# Patient Record
Sex: FEMALE | Race: WHITE | Employment: UNEMPLOYED | URBAN - METROPOLITAN AREA
[De-identification: names, ages, dates, MRNs, and addresses within clinical notes are randomized per-mention and may not be internally consistent; named-entity substitution may affect disease eponyms.]

---

## 2017-02-23 ENCOUNTER — HOSPITAL ENCOUNTER (EMERGENCY)
Age: 27
Discharge: HOME | End: 2017-02-24
Attending: EMERGENCY MEDICINE
Payer: COMMERCIAL

## 2017-02-23 DIAGNOSIS — R10.9 ABDOMINAL CRAMPS: ICD-10-CM

## 2017-02-23 DIAGNOSIS — R19.7 DIARRHEA, UNSPECIFIED TYPE: Primary | ICD-10-CM

## 2017-02-23 LAB — HCG(URINE) PREGNANCY TEST: NEGATIVE

## 2017-02-23 PROCEDURE — 99284 EMERGENCY DEPT VISIT MOD MDM: CPT

## 2017-02-23 PROCEDURE — 81001 URINALYSIS AUTO W/SCOPE: CPT

## 2017-02-23 PROCEDURE — 87804 INFLUENZA ASSAY W/OPTIC: CPT

## 2017-02-23 PROCEDURE — 84703 CHORIONIC GONADOTROPIN ASSAY: CPT

## 2017-02-23 ASSESSMENT — PAIN SCALES - GENERAL: PAINLEVEL_OUTOF10: 7

## 2017-02-23 ASSESSMENT — PAIN DESCRIPTION - LOCATION: LOCATION: ABDOMEN

## 2017-02-23 ASSESSMENT — PAIN DESCRIPTION - ORIENTATION: ORIENTATION: LOWER

## 2017-02-23 ASSESSMENT — PAIN DESCRIPTION - PAIN TYPE: TYPE: ACUTE PAIN

## 2017-02-24 VITALS
WEIGHT: 210 LBS | RESPIRATION RATE: 16 BRPM | TEMPERATURE: 98.6 F | DIASTOLIC BLOOD PRESSURE: 69 MMHG | OXYGEN SATURATION: 98 % | HEART RATE: 103 BPM | HEIGHT: 67 IN | SYSTOLIC BLOOD PRESSURE: 123 MMHG | BODY MASS INDEX: 32.96 KG/M2

## 2017-02-24 LAB
-: ABNORMAL
AMORPHOUS: ABNORMAL
BACTERIA: ABNORMAL
BILIRUBIN URINE: ABNORMAL
CASTS UA: ABNORMAL /LPF
COLOR: ABNORMAL
COMMENT UA: ABNORMAL
CRYSTALS, UA: ABNORMAL /HPF
DIRECT EXAM: NORMAL
EPITHELIAL CELLS UA: ABNORMAL /HPF
GLUCOSE URINE: NEGATIVE
KETONES, URINE: NEGATIVE
LEUKOCYTE ESTERASE, URINE: NEGATIVE
Lab: NORMAL
MUCUS: ABNORMAL
NITRITE, URINE: NEGATIVE
OTHER OBSERVATIONS UA: ABNORMAL
PH UA: 5.5 (ref 5–8)
PROTEIN UA: ABNORMAL
RBC UA: ABNORMAL /HPF
RENAL EPITHELIAL, UA: ABNORMAL /HPF
SPECIFIC GRAVITY UA: 1.03 (ref 1–1.03)
SPECIMEN DESCRIPTION: NORMAL
SPECIMEN DESCRIPTION: NORMAL
STATUS: NORMAL
TRICHOMONAS: ABNORMAL
TURBIDITY: ABNORMAL
URINE HGB: NEGATIVE
UROBILINOGEN, URINE: NORMAL
WBC UA: ABNORMAL /HPF
YEAST: ABNORMAL

## 2017-02-24 RX ORDER — LOPERAMIDE HYDROCHLORIDE 2 MG/1
2 CAPSULE ORAL 4 TIMES DAILY PRN
Qty: 10 CAPSULE | Refills: 0 | Status: SHIPPED | OUTPATIENT
Start: 2017-02-24 | End: 2017-07-18

## 2017-02-24 ASSESSMENT — ENCOUNTER SYMPTOMS
RHINORRHEA: 0
NAUSEA: 1
COUGH: 0
SHORTNESS OF BREATH: 0
PHOTOPHOBIA: 0
SORE THROAT: 0
EYE PAIN: 0
DIARRHEA: 1
ABDOMINAL PAIN: 0
VOMITING: 1
EYE DISCHARGE: 0
TROUBLE SWALLOWING: 0

## 2017-07-18 ENCOUNTER — OFFICE VISIT (OUTPATIENT)
Dept: OBGYN CLINIC | Age: 27
End: 2017-07-18
Payer: COMMERCIAL

## 2017-07-18 ENCOUNTER — HOSPITAL ENCOUNTER (OUTPATIENT)
Age: 27
Setting detail: SPECIMEN
Discharge: HOME | End: 2017-07-18
Payer: COMMERCIAL

## 2017-07-18 VITALS
DIASTOLIC BLOOD PRESSURE: 84 MMHG | SYSTOLIC BLOOD PRESSURE: 128 MMHG | HEIGHT: 66 IN | WEIGHT: 293 LBS | HEART RATE: 78 BPM | BODY MASS INDEX: 47.09 KG/M2

## 2017-07-18 DIAGNOSIS — Z30.011 FAMILY PLANNING, BCP (BIRTH CONTROL PILLS) INITIAL PRESCRIPTION: ICD-10-CM

## 2017-07-18 DIAGNOSIS — N94.6 DYSMENORRHEA: ICD-10-CM

## 2017-07-18 DIAGNOSIS — R35.0 URINARY FREQUENCY: ICD-10-CM

## 2017-07-18 DIAGNOSIS — R10.2 PELVIC PAIN IN FEMALE: ICD-10-CM

## 2017-07-18 DIAGNOSIS — Z01.419 WELL FEMALE EXAM WITH ROUTINE GYNECOLOGICAL EXAM: Primary | ICD-10-CM

## 2017-07-18 DIAGNOSIS — Z01.419 WELL FEMALE EXAM WITH ROUTINE GYNECOLOGICAL EXAM: ICD-10-CM

## 2017-07-18 LAB
BILIRUBIN URINE: NEGATIVE
COLOR: YELLOW
COMMENT UA: NORMAL
DIRECT EXAM: NORMAL
GLUCOSE URINE: NEGATIVE
KETONES, URINE: NEGATIVE
LEUKOCYTE ESTERASE, URINE: NEGATIVE
Lab: NORMAL
NITRITE, URINE: NEGATIVE
PH UA: 7 (ref 5–8)
PROTEIN UA: NEGATIVE
SPECIFIC GRAVITY UA: 1.02 (ref 1–1.03)
SPECIMEN DESCRIPTION: NORMAL
STATUS: NORMAL
TURBIDITY: CLEAR
URINE HGB: NEGATIVE
UROBILINOGEN, URINE: NORMAL

## 2017-07-18 PROCEDURE — 99214 OFFICE O/P EST MOD 30 MIN: CPT | Performed by: NURSE PRACTITIONER

## 2017-07-18 PROCEDURE — G0145 SCR C/V CYTO,THINLAYER,RESCR: HCPCS

## 2017-07-18 PROCEDURE — 87491 CHLMYD TRACH DNA AMP PROBE: CPT

## 2017-07-18 PROCEDURE — 87480 CANDIDA DNA DIR PROBE: CPT

## 2017-07-18 PROCEDURE — 87591 N.GONORRHOEAE DNA AMP PROB: CPT

## 2017-07-18 PROCEDURE — 87660 TRICHOMONAS VAGIN DIR PROBE: CPT

## 2017-07-18 PROCEDURE — 87510 GARDNER VAG DNA DIR PROBE: CPT

## 2017-07-18 PROCEDURE — 81003 URINALYSIS AUTO W/O SCOPE: CPT

## 2017-07-18 RX ORDER — NORETHINDRONE ACETATE AND ETHINYL ESTRADIOL 1MG-20(21)
1 KIT ORAL DAILY
Qty: 1 PACKET | Refills: 3 | Status: SHIPPED | OUTPATIENT
Start: 2017-07-18 | End: 2017-07-31 | Stop reason: ALTCHOICE

## 2017-07-18 ASSESSMENT — PATIENT HEALTH QUESTIONNAIRE - PHQ9
SUM OF ALL RESPONSES TO PHQ9 QUESTIONS 1 & 2: 0
1. LITTLE INTEREST OR PLEASURE IN DOING THINGS: 0
2. FEELING DOWN, DEPRESSED OR HOPELESS: 0
SUM OF ALL RESPONSES TO PHQ QUESTIONS 1-9: 0

## 2017-07-19 LAB
C TRACH DNA GENITAL QL NAA+PROBE: NEGATIVE
N. GONORRHOEAE DNA: NEGATIVE

## 2017-07-21 LAB — CYTOLOGY REPORT: NORMAL

## 2017-07-31 ENCOUNTER — TELEPHONE (OUTPATIENT)
Dept: OBGYN CLINIC | Age: 27
End: 2017-07-31

## 2017-07-31 RX ORDER — NORGESTIMATE AND ETHINYL ESTRADIOL 7DAYSX3 28
1 KIT ORAL DAILY
Qty: 28 TABLET | Refills: 3 | Status: SHIPPED | OUTPATIENT
Start: 2017-07-31 | End: 2017-08-08

## 2017-08-02 ENCOUNTER — TELEPHONE (OUTPATIENT)
Dept: OBGYN CLINIC | Age: 27
End: 2017-08-02

## 2017-08-08 ENCOUNTER — OFFICE VISIT (OUTPATIENT)
Dept: FAMILY MEDICINE CLINIC | Age: 27
End: 2017-08-08
Payer: COMMERCIAL

## 2017-08-08 VITALS
SYSTOLIC BLOOD PRESSURE: 128 MMHG | OXYGEN SATURATION: 98 % | BODY MASS INDEX: 47.09 KG/M2 | WEIGHT: 293 LBS | HEIGHT: 66 IN | HEART RATE: 104 BPM | DIASTOLIC BLOOD PRESSURE: 89 MMHG

## 2017-08-08 DIAGNOSIS — R63.5 WEIGHT GAIN: ICD-10-CM

## 2017-08-08 DIAGNOSIS — Z11.4 ENCOUNTER FOR SCREENING FOR HIV: ICD-10-CM

## 2017-08-08 DIAGNOSIS — E66.01 MORBID OBESITY DUE TO EXCESS CALORIES (HCC): Primary | ICD-10-CM

## 2017-08-08 DIAGNOSIS — R53.82 CHRONIC FATIGUE: ICD-10-CM

## 2017-08-08 PROCEDURE — 99204 OFFICE O/P NEW MOD 45 MIN: CPT | Performed by: FAMILY MEDICINE

## 2017-10-23 ENCOUNTER — OFFICE VISIT (OUTPATIENT)
Dept: OBGYN CLINIC | Age: 27
End: 2017-10-23
Payer: COMMERCIAL

## 2017-10-23 VITALS
BODY MASS INDEX: 45.99 KG/M2 | DIASTOLIC BLOOD PRESSURE: 72 MMHG | WEIGHT: 293 LBS | HEIGHT: 67 IN | RESPIRATION RATE: 18 BRPM | HEART RATE: 80 BPM | SYSTOLIC BLOOD PRESSURE: 116 MMHG

## 2017-10-23 DIAGNOSIS — N94.6 DYSMENORRHEA: Primary | ICD-10-CM

## 2017-10-23 DIAGNOSIS — N92.0 MENORRHAGIA WITH REGULAR CYCLE: ICD-10-CM

## 2017-10-23 PROCEDURE — 99213 OFFICE O/P EST LOW 20 MIN: CPT | Performed by: ADVANCED PRACTICE MIDWIFE

## 2017-10-23 PROCEDURE — 4004F PT TOBACCO SCREEN RCVD TLK: CPT | Performed by: ADVANCED PRACTICE MIDWIFE

## 2017-10-23 PROCEDURE — G8417 CALC BMI ABV UP PARAM F/U: HCPCS | Performed by: ADVANCED PRACTICE MIDWIFE

## 2017-10-23 PROCEDURE — G8484 FLU IMMUNIZE NO ADMIN: HCPCS | Performed by: ADVANCED PRACTICE MIDWIFE

## 2017-10-23 PROCEDURE — G8427 DOCREV CUR MEDS BY ELIG CLIN: HCPCS | Performed by: ADVANCED PRACTICE MIDWIFE

## 2017-10-23 RX ORDER — IBUPROFEN 800 MG/1
800 TABLET ORAL EVERY 8 HOURS PRN
Qty: 30 TABLET | Refills: 1 | Status: SHIPPED | OUTPATIENT
Start: 2017-10-23 | End: 2017-10-31 | Stop reason: SDUPTHER

## 2017-10-23 RX ORDER — NORGESTIMATE AND ETHINYL ESTRADIOL 7DAYSX3 28
1 KIT ORAL DAILY
Qty: 28 TABLET | Refills: 9 | Status: SHIPPED | OUTPATIENT
Start: 2017-10-23 | End: 2017-10-23

## 2017-10-23 ASSESSMENT — PATIENT HEALTH QUESTIONNAIRE - PHQ9
2. FEELING DOWN, DEPRESSED OR HOPELESS: 0
1. LITTLE INTEREST OR PLEASURE IN DOING THINGS: 0
SUM OF ALL RESPONSES TO PHQ9 QUESTIONS 1 & 2: 0
SUM OF ALL RESPONSES TO PHQ QUESTIONS 1-9: 0

## 2017-10-23 NOTE — PROGRESS NOTES
Radha Espinoza  10/23/2017    YOB: 1990          The patient was seen today. She is here regarding BCP causing her H/A and upset stomach, patient stopped taking it. She states she is sexually active and will use condoms. She wanted to start BCP because of her periods being painful and heavy. Her bowels are regular and she is voiding without difficulty. HPI:  Jose J Velazco is a 33 y.o. female  Dysmenorrhea and menorrhagia, does not want to take BCP       Obstetric History       T0      L0     SAB0   TAB0   Ectopic0   Molar0   Multiple0   Live Births0           Past Medical History:   Diagnosis Date    H/O trichomonal vaginitis     Obesity        Past Surgical History:   Procedure Laterality Date    MOUTH SURGERY         Family History   Problem Relation Age of Onset    Mental Illness Mother     COPD Mother     No Known Problems Father        Social History     Social History    Marital status: Single     Spouse name: N/A    Number of children: N/A    Years of education: N/A     Occupational History    Not on file. Social History Main Topics    Smoking status: Current Every Day Smoker     Packs/day: 0.50     Types: Cigarettes    Smokeless tobacco: Never Used    Alcohol use No    Drug use: No    Sexual activity: Yes     Partners: Male     Other Topics Concern    Not on file     Social History Narrative    No narrative on file         MEDICATIONS:  Current Outpatient Prescriptions   Medication Sig Dispense Refill    ibuprofen (ADVIL;MOTRIN) 800 MG tablet Take 1 tablet by mouth every 8 hours as needed for Pain 30 tablet 1     No current facility-administered medications for this visit. ALLERGIES:  Allergies as of 10/23/2017    (No Known Allergies)         REVIEW OF SYSTEMS:    yes   A minimum of an eleven point review of systems was completed. Review Of Systems (11 point):  Constitutional: No fever, chills or malaise;  No weight change or fatigue  Head and Eyes: No vision, Headache, Dizziness or trauma in last 12 months  ENT ROS: No hearing, Tinnitis, sinus or taste problems  Hematological and Lymphatic ROS:No Lymphoma, Von Willebrand's, Hemophillia or Bleeding History  Psych ROS: No Depression, Homicidal thoughts,suicidal thoughts, or anxiety  Breast ROS: No prior breast abnormalities or lumps  Respiratory ROS: No SOB, Pneumoniae,Cough, or Pulmonary Embolism History  Cardiovascular ROS: No Chest Pain with Exertion, Palpitations, Syncope, Edema, Arrhythmia  Gastrointestinal ROS: No Indigestion, Heartburn, Nausea, vomiting, Diarrhea, Constipation,or Bowel Changes; No Bloody Stools or melena  Genito-Urinary ROS: No Dysuria, Hematuria or Nocturia. No Urinary Incontinence or Vaginal Discharge  Musculoskeletal ROS: No Arthralgia, Arthritis,Gout,Osteoporosis or Rheumatism  Neurological ROS: No CVA, Migraines, Epilepsy, Seizure Hx, or Limb Weakness  Dermatological ROS: No Rash, Itching, Hives, Mole Changes or Cancer          Blood pressure 116/72, pulse 80, resp. rate 18, height 5' 7\" (1.702 m), weight (!) 316 lb (143.3 kg), last menstrual period 10/21/2017, not currently breastfeeding. Abdomen: Soft non-tender; good bowel sounds. No guarding, rebound or rigidity. No CVA tenderness bilaterally. Extremities: No calf tenderness, DTR 2/4, and No edema bilaterally    Pelvic: declined    Diagnostics:  No results found. Lab Results:  Results for orders placed or performed during the hospital encounter of 07/18/17   VAGINITIS DNA PROBE   Result Value Ref Range    Specimen Description . VAGINAL SWAB     Special Requests NOT REPORTED     Direct Exam NEGATIVE for Gardnerella vaginalis     Direct Exam NEGATIVE for Trichomonas vaginalis     Direct Exam NEGATIVE for Candida sp.      Direct Exam       Method of testing is a DNA probe intended for detection and identification of    Direct Exam        Candida species, Gardnerella vaginalis, and Trichomonas found. Patient Active Problem List    Diagnosis Date Noted    H/O trichomonal vaginitis            PLAN:  Rx for Motrin 800 mg sent to pharmacy to take one tablet every 8 hours for first 48 hours of cycle  Repeat Annual every 1 year  Cervical Cytology Evaluation begins at 24years old. If Negative Cytology, Follow-up screening per current guidelines. Return to the office prn. Counseled on preventative health maintenance follow-up. No orders of the defined types were placed in this encounter. Patient was seen with total face to face time of 15 minutes. More than 50% of this visit was counseling and education regarding There were no encounter diagnoses. and 3 Month Follow-Up   as well as  counseling on preventative health maintenance follow-up.

## 2017-11-01 RX ORDER — IBUPROFEN 800 MG/1
800 TABLET ORAL EVERY 8 HOURS PRN
Qty: 30 TABLET | Refills: 1 | Status: SHIPPED | OUTPATIENT
Start: 2017-11-01 | End: 2017-11-16 | Stop reason: SDUPTHER

## 2017-11-12 ENCOUNTER — HOSPITAL ENCOUNTER (EMERGENCY)
Age: 27
Discharge: HOME | End: 2017-11-12
Attending: EMERGENCY MEDICINE
Payer: COMMERCIAL

## 2017-11-12 VITALS
TEMPERATURE: 97.6 F | OXYGEN SATURATION: 99 % | SYSTOLIC BLOOD PRESSURE: 125 MMHG | HEART RATE: 102 BPM | RESPIRATION RATE: 18 BRPM | HEIGHT: 67 IN | WEIGHT: 250 LBS | DIASTOLIC BLOOD PRESSURE: 74 MMHG | BODY MASS INDEX: 39.24 KG/M2

## 2017-11-12 DIAGNOSIS — K08.89 PAIN, DENTAL: Primary | ICD-10-CM

## 2017-11-12 PROCEDURE — 99282 EMERGENCY DEPT VISIT SF MDM: CPT

## 2017-11-12 RX ORDER — PENICILLIN V POTASSIUM 500 MG/1
500 TABLET ORAL 4 TIMES DAILY
Qty: 40 TABLET | Refills: 0 | Status: SHIPPED | OUTPATIENT
Start: 2017-11-12 | End: 2017-11-22

## 2017-11-12 RX ORDER — IBUPROFEN 800 MG/1
800 TABLET ORAL EVERY 6 HOURS PRN
Qty: 20 TABLET | Refills: 0 | Status: SHIPPED | OUTPATIENT
Start: 2017-11-12 | End: 2019-04-01

## 2017-11-12 RX ORDER — ACETAMINOPHEN AND CODEINE PHOSPHATE 300; 30 MG/1; MG/1
1 TABLET ORAL EVERY 4 HOURS PRN
Qty: 10 TABLET | Refills: 0 | Status: SHIPPED | OUTPATIENT
Start: 2017-11-12 | End: 2018-01-25 | Stop reason: ALTCHOICE

## 2017-11-12 ASSESSMENT — PAIN SCALES - GENERAL: PAINLEVEL_OUTOF10: 7

## 2017-11-12 ASSESSMENT — PAIN DESCRIPTION - LOCATION: LOCATION: TEETH

## 2017-11-12 ASSESSMENT — PAIN DESCRIPTION - PAIN TYPE: TYPE: ACUTE PAIN

## 2017-11-12 ASSESSMENT — PAIN DESCRIPTION - DESCRIPTORS: DESCRIPTORS: CONSTANT

## 2017-11-12 NOTE — ED PROVIDER NOTES
(Oral)   Resp 18   Ht 5' 7\" (1.702 m)   Wt 250 lb (113.4 kg)   LMP 09/21/2017   SpO2 99%   BMI 39.16 kg/m²   CONSTITUTIONAL: Vital signs reviewed, Alert and oriented X 3. HEAD: Atraumatic, Normocephalic. EYES: Eyes are normal to inspection, Pupils equal, round and reactive to light. NECK: Normal ROM, No jugular venous distention, No meningeal signs, Cervical spine nontender. MOUTH:  + dental pain at 1, 17, with no signs of abscess formation or Chris sign noted. No swelling involving the airway at all. No trismus. Lips are normal.  No tongue elevation. Speaking in full sentences. RESPIRATORY CHEST: No respiratory distress. ABDOMEN: Abdomen is nontender, No distension. UPPER EXTREMITY: Inspection normal, No cyanosis. NEURO: GCS is 15, Speech normal, Memory normal.   SKIN: Skin is warm, Skin is dry. PSYCHIATRIC: Oriented X 3, Normal affect. EMERGENCY DEPARTMENT COURSE:   Pain meds and antibiotic prescriptions. Pt provided with dental clinic list and instructed to call as soon as possible for an appointment. Instructed to return for worsening or any new symptoms including throat swelling, difficulty swallowing or breathing. Pt agrees. FINAL IMPRESSION:     1. Pain, dental          DISPOSITION:  DISPOSITION Decision to Discharge      PATIENT REFERRED TO:  Moy Mcduffie MD  [address] [facility] [address] [phone]    Call in 1 day      [address] [facility]  [place] [address]  [phone]    If symptoms worsen    dentist  see dental list          DISCHARGE MEDICATIONS:  New Prescriptions    ACETAMINOPHEN-CODEINE (TYLENOL/CODEINE #3) 300-30 MG PER TABLET    Take 1 tablet by mouth every 4 hours as needed for Pain .     IBUPROFEN (ADVIL;MOTRIN) 800 MG TABLET    Take 1 tablet by mouth every 6 hours as needed for Pain    PENICILLIN V POTASSIUM (VEETID) 500 MG TABLET    Take 1 tablet by mouth 4 times daily for 10 days       (Please note that

## 2017-11-16 RX ORDER — IBUPROFEN 800 MG/1
800 TABLET ORAL EVERY 8 HOURS PRN
Qty: 30 TABLET | Refills: 1 | Status: SHIPPED | OUTPATIENT
Start: 2017-11-16 | End: 2018-01-25 | Stop reason: SDUPTHER

## 2018-01-25 ENCOUNTER — OFFICE VISIT (OUTPATIENT)
Dept: INTERNAL MEDICINE CLINIC | Age: 28
End: 2018-01-25
Payer: COMMERCIAL

## 2018-01-25 VITALS
WEIGHT: 293 LBS | HEIGHT: 67 IN | BODY MASS INDEX: 45.99 KG/M2 | DIASTOLIC BLOOD PRESSURE: 68 MMHG | HEART RATE: 83 BPM | SYSTOLIC BLOOD PRESSURE: 122 MMHG | OXYGEN SATURATION: 99 %

## 2018-01-25 DIAGNOSIS — F17.200 SMOKER: ICD-10-CM

## 2018-01-25 DIAGNOSIS — E66.01 MORBIDLY OBESE (HCC): ICD-10-CM

## 2018-01-25 DIAGNOSIS — G47.33 OSA (OBSTRUCTIVE SLEEP APNEA): ICD-10-CM

## 2018-01-25 DIAGNOSIS — I10 ESSENTIAL HYPERTENSION: Primary | ICD-10-CM

## 2018-01-25 PROCEDURE — 99215 OFFICE O/P EST HI 40 MIN: CPT | Performed by: INTERNAL MEDICINE

## 2018-01-25 PROCEDURE — G8417 CALC BMI ABV UP PARAM F/U: HCPCS | Performed by: INTERNAL MEDICINE

## 2018-01-25 PROCEDURE — 4004F PT TOBACCO SCREEN RCVD TLK: CPT | Performed by: INTERNAL MEDICINE

## 2018-01-25 PROCEDURE — G8484 FLU IMMUNIZE NO ADMIN: HCPCS | Performed by: INTERNAL MEDICINE

## 2018-01-25 PROCEDURE — G8427 DOCREV CUR MEDS BY ELIG CLIN: HCPCS | Performed by: INTERNAL MEDICINE

## 2018-01-25 RX ORDER — VARENICLINE TARTRATE 25 MG
KIT ORAL
Qty: 1 EACH | Refills: 0 | Status: SHIPPED | OUTPATIENT
Start: 2018-01-25 | End: 2018-02-22 | Stop reason: SDUPTHER

## 2018-01-25 RX ORDER — GUAIFENESIN AND DEXTROMETHORPHAN HYDROBROMIDE 600; 30 MG/1; MG/1
TABLET, EXTENDED RELEASE ORAL
Refills: 0 | COMMUNITY
Start: 2017-11-09 | End: 2019-04-01

## 2018-01-25 RX ORDER — FLUCONAZOLE 150 MG/1
TABLET ORAL
COMMUNITY
Start: 2017-11-03 | End: 2018-01-25 | Stop reason: ALTCHOICE

## 2018-01-25 RX ORDER — MEDICAL SUPPLY, MISCELLANEOUS
1 EACH MISCELLANEOUS DAILY
Qty: 1 EACH | Refills: 0 | Status: SHIPPED | OUTPATIENT
Start: 2018-01-25 | End: 2019-04-01

## 2018-01-25 ASSESSMENT — ENCOUNTER SYMPTOMS
SHORTNESS OF BREATH: 0
EYE REDNESS: 0
BACK PAIN: 0
EYE ITCHING: 0
ABDOMINAL PAIN: 0
CHEST TIGHTNESS: 0
DIARRHEA: 0
EYE DISCHARGE: 0
COLOR CHANGE: 0
APNEA: 1
ABDOMINAL DISTENTION: 0
COUGH: 0
CONSTIPATION: 0
BLOOD IN STOOL: 0
EYE PAIN: 0
CHOKING: 0

## 2018-01-25 ASSESSMENT — PATIENT HEALTH QUESTIONNAIRE - PHQ9
1. LITTLE INTEREST OR PLEASURE IN DOING THINGS: 1
SUM OF ALL RESPONSES TO PHQ QUESTIONS 1-9: 2
SUM OF ALL RESPONSES TO PHQ9 QUESTIONS 1 & 2: 2
2. FEELING DOWN, DEPRESSED OR HOPELESS: 1

## 2018-01-25 NOTE — PATIENT INSTRUCTIONS
slimmer you. You probably will lose weight very quickly in the first few months after surgery. As time goes on, your weight loss will slow down. How much weight you lose depends on what type of surgery you had and how well your new eating and activity plans are working for you. · A new way of eating. Success in reaching and keeping a healthy weight depends on making lifelong changes in how you eat. After surgery, you raise your chances of success if you:  ¨ Eat just a few ounces of food at a time. ¨ Eat very slowly and chew your food to mush. ¨ Don't drink for 30 minutes before you eat, during your meal, and for 30 minutes after you eat. ¨ Are careful about drinking alcohol. ¨ Avoid foods that are high in fat or sugar. ¨ Take vitamin and mineral supplements. · A healthier you. Weight-loss surgery can have some real health benefits. Problems like diabetes, high blood pressure, and sleep apnea may go away-or at least become easier to manage. · A more active you. After surgery, being active on most days of the week will help you reach your weight goal and avoid gaining back the weight you lose. · A lot of extra skin. When you lose weight quickly, you may have a lot of extra skin. That's normal. You can have surgery to remove the extra skin if it bothers you. There are going to be some ups and downs while you get used to these changes. So another way to adjust is to identify who can help support you. Getting support from friends and family can help. And joining a support group for people who have had the surgery can be a big help too, because they know what you're going through. As you know, it's a big decision to have weight-loss surgery. But when you have a plan, you can focus on losing weight and living a healthier life. So what steps can you take to prepare for weight-loss surgery? Will you set some goals? Will you learn about how surgery can affect your life? How about asking family or friends for help?

## 2018-01-30 ENCOUNTER — HOSPITAL ENCOUNTER (EMERGENCY)
Age: 28
Discharge: HOME | End: 2018-01-30
Attending: EMERGENCY MEDICINE
Payer: COMMERCIAL

## 2018-01-30 VITALS
BODY MASS INDEX: 44.73 KG/M2 | OXYGEN SATURATION: 96 % | HEIGHT: 67 IN | TEMPERATURE: 98.1 F | RESPIRATION RATE: 16 BRPM | DIASTOLIC BLOOD PRESSURE: 96 MMHG | SYSTOLIC BLOOD PRESSURE: 164 MMHG | HEART RATE: 96 BPM | WEIGHT: 285 LBS

## 2018-01-30 DIAGNOSIS — L30.9 DERMATITIS: Primary | ICD-10-CM

## 2018-01-30 PROCEDURE — 99282 EMERGENCY DEPT VISIT SF MDM: CPT

## 2018-01-30 RX ORDER — DIPHENHYDRAMINE HCL 25 MG
25 CAPSULE ORAL EVERY 6 HOURS PRN
Qty: 20 CAPSULE | Refills: 0 | Status: SHIPPED | OUTPATIENT
Start: 2018-01-30 | End: 2018-02-04

## 2018-01-30 RX ORDER — FAMOTIDINE 20 MG/1
20 TABLET, FILM COATED ORAL 2 TIMES DAILY
Qty: 20 TABLET | Refills: 0 | Status: SHIPPED | OUTPATIENT
Start: 2018-01-30 | End: 2019-04-01

## 2018-01-30 ASSESSMENT — ENCOUNTER SYMPTOMS
THROAT SWELLING: 0
VOMITING: 0
WHEEZING: 0
SORE THROAT: 0

## 2018-02-22 DIAGNOSIS — F17.200 SMOKER: ICD-10-CM

## 2018-02-22 RX ORDER — VARENICLINE TARTRATE
KIT
Qty: 1 EACH | Refills: 1 | Status: SHIPPED | OUTPATIENT
Start: 2018-02-22 | End: 2019-04-01

## 2019-03-10 ENCOUNTER — HOSPITAL ENCOUNTER (EMERGENCY)
Age: 29
Discharge: HOME | End: 2019-03-10
Attending: EMERGENCY MEDICINE
Payer: COMMERCIAL

## 2019-03-10 VITALS
OXYGEN SATURATION: 97 % | TEMPERATURE: 98.1 F | SYSTOLIC BLOOD PRESSURE: 149 MMHG | DIASTOLIC BLOOD PRESSURE: 92 MMHG | RESPIRATION RATE: 17 BRPM | HEIGHT: 66 IN | HEART RATE: 93 BPM | WEIGHT: 275 LBS | BODY MASS INDEX: 44.2 KG/M2

## 2019-03-10 DIAGNOSIS — J06.9 UPPER RESPIRATORY TRACT INFECTION, UNSPECIFIED TYPE: Primary | ICD-10-CM

## 2019-03-10 LAB
DIRECT EXAM: NORMAL
DIRECT EXAM: NORMAL
Lab: NORMAL
Lab: NORMAL
SPECIMEN DESCRIPTION: NORMAL
SPECIMEN DESCRIPTION: NORMAL

## 2019-03-10 PROCEDURE — 99283 EMERGENCY DEPT VISIT LOW MDM: CPT

## 2019-03-10 PROCEDURE — 87880 STREP A ASSAY W/OPTIC: CPT

## 2019-03-10 PROCEDURE — 87804 INFLUENZA ASSAY W/OPTIC: CPT

## 2019-03-10 RX ORDER — FLUTICASONE PROPIONATE 50 MCG
1 SPRAY, SUSPENSION (ML) NASAL DAILY
Qty: 1 BOTTLE | Refills: 0 | Status: SHIPPED | OUTPATIENT
Start: 2019-03-10 | End: 2019-04-01

## 2019-03-10 ASSESSMENT — PAIN DESCRIPTION - LOCATION: LOCATION: HEAD

## 2019-03-10 ASSESSMENT — ENCOUNTER SYMPTOMS
COUGH: 1
RHINORRHEA: 1

## 2019-03-10 ASSESSMENT — PAIN DESCRIPTION - DESCRIPTORS: DESCRIPTORS: HEADACHE

## 2019-03-10 ASSESSMENT — PAIN DESCRIPTION - FREQUENCY: FREQUENCY: CONTINUOUS

## 2019-03-10 ASSESSMENT — PAIN SCALES - GENERAL: PAINLEVEL_OUTOF10: 4

## 2019-03-10 ASSESSMENT — PAIN DESCRIPTION - PAIN TYPE: TYPE: ACUTE PAIN

## 2019-03-13 ENCOUNTER — TELEPHONE (OUTPATIENT)
Dept: INTERNAL MEDICINE CLINIC | Age: 29
End: 2019-03-13

## 2019-04-01 ENCOUNTER — HOSPITAL ENCOUNTER (OUTPATIENT)
Age: 29
Setting detail: SPECIMEN
Discharge: HOME | End: 2019-04-01
Payer: COMMERCIAL

## 2019-04-01 ENCOUNTER — OFFICE VISIT (OUTPATIENT)
Dept: OBGYN CLINIC | Age: 29
End: 2019-04-01
Payer: COMMERCIAL

## 2019-04-01 VITALS
RESPIRATION RATE: 19 BRPM | BODY MASS INDEX: 45.99 KG/M2 | WEIGHT: 293 LBS | SYSTOLIC BLOOD PRESSURE: 145 MMHG | TEMPERATURE: 97.4 F | HEIGHT: 67 IN | DIASTOLIC BLOOD PRESSURE: 90 MMHG | HEART RATE: 92 BPM

## 2019-04-01 DIAGNOSIS — R03.0 ELEVATED BLOOD PRESSURE READING: ICD-10-CM

## 2019-04-01 DIAGNOSIS — N92.6 MISSED MENSES: ICD-10-CM

## 2019-04-01 DIAGNOSIS — N92.6 MISSED MENSES: Primary | ICD-10-CM

## 2019-04-01 DIAGNOSIS — Z32.02 NEGATIVE PREGNANCY TEST: ICD-10-CM

## 2019-04-01 DIAGNOSIS — N91.2 AMENORRHEA: ICD-10-CM

## 2019-04-01 LAB
CONTROL: NORMAL
HCG QUANTITATIVE: <1 IU/L
PREGNANCY TEST URINE, POC: NEGATIVE

## 2019-04-01 PROCEDURE — G8427 DOCREV CUR MEDS BY ELIG CLIN: HCPCS | Performed by: CLINICAL NURSE SPECIALIST

## 2019-04-01 PROCEDURE — 81025 URINE PREGNANCY TEST: CPT | Performed by: CLINICAL NURSE SPECIALIST

## 2019-04-01 PROCEDURE — 99203 OFFICE O/P NEW LOW 30 MIN: CPT | Performed by: CLINICAL NURSE SPECIALIST

## 2019-04-01 PROCEDURE — G8417 CALC BMI ABV UP PARAM F/U: HCPCS | Performed by: CLINICAL NURSE SPECIALIST

## 2019-04-01 PROCEDURE — 4004F PT TOBACCO SCREEN RCVD TLK: CPT | Performed by: CLINICAL NURSE SPECIALIST

## 2019-04-01 ASSESSMENT — ENCOUNTER SYMPTOMS
RESPIRATORY NEGATIVE: 1
EYES NEGATIVE: 1
GASTROINTESTINAL NEGATIVE: 1
ALLERGIC/IMMUNOLOGIC NEGATIVE: 1

## 2019-04-01 NOTE — PROGRESS NOTES
Subjective:      Patient ID:  Darryn Cohen is a 33 y.o. female who presents for   Chief Complaint   Patient presents with    Amenorrhea     LMP 1/15/2019       HPI     New patient is a 34 yo female who presents for missed menses for the past 2 months. Patient reports that she has never missed menstrual cycle in the past.  Once monthly menses lasting 7 days and heavy days 1-5 then becomes moderate. Patient denies any menstrual cramps. Patient reports that she started a new job but does not feel stressed over it. Review of Systems   Constitutional: Negative for chills and fever. HENT: Negative. Eyes: Negative. Respiratory: Negative. Cardiovascular: Negative. Gastrointestinal: Negative. Endocrine: Negative. Genitourinary: Positive for menstrual problem (patient has missed the last 2 months of her menstrual cycle and reports this is not normal for her). Musculoskeletal: Negative. Skin: Negative. Allergic/Immunologic: Negative. Neurological: Negative. Hematological: Negative. Psychiatric/Behavioral: Negative. BP (!) 145/90   Pulse 92   Temp 97.4 °F (36.3 °C) (Oral)   Resp 19   Ht 5' 7\" (1.702 m)   Wt (!) 330 lb 9.6 oz (150 kg)   LMP 01/15/2019 (Approximate)   BMI 51.78 kg/m²    Patient's last menstrual period was 01/15/2019 (approximate). Objective:   Physical Exam    Assessment:      Diagnosis Orders   1. Missed menses  POCT urine pregnancy    HCG, Quantitative, Pregnancy   2. Amenorrhea  POCT urine pregnancy    HCG, Quantitative, Pregnancy   3. Negative pregnancy test     4. Elevated blood pressure reading  [address] [facility], [facility], Family Medicine, [employer]           Plan:    Discussed with patient to monitor her BP and record and take recordings to PCP and patient verbalized understanding  Discussed with patient that if menses does not return next month she should schedule another appt. And will give provera to induce a menses.   Patient verbalized understanding. Patient was given FIONA Cabezas NP card     Return in about 1 month (around 5/1/2019) for if no menses. Patient was seen with total face to face time of 30 minutes. More than 50% of this visit was on counseling andeducation regarding the problems listed below and her options. She was also counseled on her preventative health maintenance recommendations and follow-up.     Electronically signed by: Tonya Lee CNP

## 2019-04-01 NOTE — PROGRESS NOTES
Patient was in the office today for a bhcg. Draw per physician order using sterile technique. Drawn from the right antecube.

## 2019-08-07 ENCOUNTER — HOSPITAL ENCOUNTER (OUTPATIENT)
Age: 29
Setting detail: SPECIMEN
Discharge: HOME | End: 2019-08-07
Payer: COMMERCIAL

## 2019-08-07 ENCOUNTER — OFFICE VISIT (OUTPATIENT)
Dept: OBGYN CLINIC | Age: 29
End: 2019-08-07
Payer: COMMERCIAL

## 2019-08-07 VITALS
WEIGHT: 293 LBS | DIASTOLIC BLOOD PRESSURE: 91 MMHG | BODY MASS INDEX: 45.99 KG/M2 | HEIGHT: 67 IN | SYSTOLIC BLOOD PRESSURE: 156 MMHG | HEART RATE: 109 BPM

## 2019-08-07 DIAGNOSIS — N91.2 AMENORRHEA: ICD-10-CM

## 2019-08-07 DIAGNOSIS — N91.2 AMENORRHEA: Primary | ICD-10-CM

## 2019-08-07 LAB
FOLLICLE STIMULATING HORMONE: 3.2 U/L (ref 1.7–21.5)
HCG QUANTITATIVE: <1 IU/L
LH: 10.4 U/L (ref 1–95.6)
TSH SERPL DL<=0.05 MIU/L-ACNC: 3.47 MIU/L (ref 0.3–5)

## 2019-08-07 PROCEDURE — 99213 OFFICE O/P EST LOW 20 MIN: CPT | Performed by: SPECIALIST

## 2019-08-07 PROCEDURE — 4004F PT TOBACCO SCREEN RCVD TLK: CPT | Performed by: SPECIALIST

## 2019-08-07 PROCEDURE — G8427 DOCREV CUR MEDS BY ELIG CLIN: HCPCS | Performed by: SPECIALIST

## 2019-08-07 PROCEDURE — G8417 CALC BMI ABV UP PARAM F/U: HCPCS | Performed by: SPECIALIST

## 2019-08-07 ASSESSMENT — ENCOUNTER SYMPTOMS
COUGH: 0
APNEA: 0
VOMITING: 0
DIARRHEA: 0
ABDOMINAL PAIN: 0
CONSTIPATION: 0
NAUSEA: 0
EYE PAIN: 0
ABDOMINAL DISTENTION: 0

## 2019-08-07 NOTE — PROGRESS NOTES
Appointment for ultrasound. Appointment in 1 week. Jesus Velazco am scribing for, and in the presence of Dr. Denise Kellogg.    Electronically signed by: Mera Jenkins 8/7/19 5:03 PM

## 2019-08-20 ENCOUNTER — OFFICE VISIT (OUTPATIENT)
Dept: OBGYN CLINIC | Age: 29
End: 2019-08-20
Payer: COMMERCIAL

## 2019-08-20 VITALS
HEIGHT: 67 IN | SYSTOLIC BLOOD PRESSURE: 153 MMHG | WEIGHT: 293 LBS | BODY MASS INDEX: 45.99 KG/M2 | DIASTOLIC BLOOD PRESSURE: 100 MMHG

## 2019-08-20 DIAGNOSIS — R03.0 ELEVATED BLOOD PRESSURE READING: ICD-10-CM

## 2019-08-20 DIAGNOSIS — N83.201 RIGHT OVARIAN CYST: ICD-10-CM

## 2019-08-20 DIAGNOSIS — E28.2 PCOS (POLYCYSTIC OVARIAN SYNDROME): Primary | ICD-10-CM

## 2019-08-20 PROCEDURE — G8427 DOCREV CUR MEDS BY ELIG CLIN: HCPCS | Performed by: SPECIALIST

## 2019-08-20 PROCEDURE — G8417 CALC BMI ABV UP PARAM F/U: HCPCS | Performed by: SPECIALIST

## 2019-08-20 PROCEDURE — 99213 OFFICE O/P EST LOW 20 MIN: CPT | Performed by: SPECIALIST

## 2019-08-20 PROCEDURE — 4004F PT TOBACCO SCREEN RCVD TLK: CPT | Performed by: SPECIALIST

## 2019-08-20 ASSESSMENT — ENCOUNTER SYMPTOMS
NAUSEA: 0
ABDOMINAL PAIN: 0
APNEA: 0
DIARRHEA: 0
COUGH: 0
EYE PAIN: 0
VOMITING: 0
ABDOMINAL DISTENTION: 0
CONSTIPATION: 0

## 2019-08-22 ENCOUNTER — OFFICE VISIT (OUTPATIENT)
Dept: FAMILY MEDICINE CLINIC | Age: 29
End: 2019-08-22
Payer: COMMERCIAL

## 2019-08-22 VITALS
BODY MASS INDEX: 45.99 KG/M2 | RESPIRATION RATE: 18 BRPM | TEMPERATURE: 97.2 F | SYSTOLIC BLOOD PRESSURE: 140 MMHG | HEIGHT: 67 IN | DIASTOLIC BLOOD PRESSURE: 89 MMHG | HEART RATE: 97 BPM | WEIGHT: 293 LBS | OXYGEN SATURATION: 99 %

## 2019-08-22 DIAGNOSIS — E28.2 POLYCYSTIC OVARIAN SYNDROME: ICD-10-CM

## 2019-08-22 DIAGNOSIS — E66.01 MORBID OBESITY WITH BMI OF 50.0-59.9, ADULT (HCC): ICD-10-CM

## 2019-08-22 DIAGNOSIS — I10 ESSENTIAL HYPERTENSION: Primary | ICD-10-CM

## 2019-08-22 PROCEDURE — 4004F PT TOBACCO SCREEN RCVD TLK: CPT | Performed by: FAMILY MEDICINE

## 2019-08-22 PROCEDURE — 99214 OFFICE O/P EST MOD 30 MIN: CPT | Performed by: FAMILY MEDICINE

## 2019-08-22 PROCEDURE — G8417 CALC BMI ABV UP PARAM F/U: HCPCS | Performed by: FAMILY MEDICINE

## 2019-08-22 PROCEDURE — G8427 DOCREV CUR MEDS BY ELIG CLIN: HCPCS | Performed by: FAMILY MEDICINE

## 2019-08-22 RX ORDER — LISINOPRIL 2.5 MG/1
2.5 TABLET ORAL DAILY
Qty: 30 TABLET | Refills: 1 | Status: SHIPPED | OUTPATIENT
Start: 2019-08-22 | End: 2019-09-19 | Stop reason: SDUPTHER

## 2019-08-22 ASSESSMENT — PATIENT HEALTH QUESTIONNAIRE - PHQ9
6. FEELING BAD ABOUT YOURSELF - OR THAT YOU ARE A FAILURE OR HAVE LET YOURSELF OR YOUR FAMILY DOWN: 0
SUM OF ALL RESPONSES TO PHQ9 QUESTIONS 1 & 2: 1
8. MOVING OR SPEAKING SO SLOWLY THAT OTHER PEOPLE COULD HAVE NOTICED. OR THE OPPOSITE, BEING SO FIGETY OR RESTLESS THAT YOU HAVE BEEN MOVING AROUND A LOT MORE THAN USUAL: 0
4. FEELING TIRED OR HAVING LITTLE ENERGY: 1
2. FEELING DOWN, DEPRESSED OR HOPELESS: 1
9. THOUGHTS THAT YOU WOULD BE BETTER OFF DEAD, OR OF HURTING YOURSELF: 0
SUM OF ALL RESPONSES TO PHQ QUESTIONS 1-9: 8
SUM OF ALL RESPONSES TO PHQ QUESTIONS 1-9: 8
10. IF YOU CHECKED OFF ANY PROBLEMS, HOW DIFFICULT HAVE THESE PROBLEMS MADE IT FOR YOU TO DO YOUR WORK, TAKE CARE OF THINGS AT HOME, OR GET ALONG WITH OTHER PEOPLE: 1
1. LITTLE INTEREST OR PLEASURE IN DOING THINGS: 0
3. TROUBLE FALLING OR STAYING ASLEEP: 3
7. TROUBLE CONCENTRATING ON THINGS, SUCH AS READING THE NEWSPAPER OR WATCHING TELEVISION: 0
5. POOR APPETITE OR OVEREATING: 3

## 2019-08-22 ASSESSMENT — ENCOUNTER SYMPTOMS
ABDOMINAL PAIN: 0
SHORTNESS OF BREATH: 0
NAUSEA: 0
SORE THROAT: 0

## 2019-08-28 ENCOUNTER — HOSPITAL ENCOUNTER (OUTPATIENT)
Age: 29
Discharge: HOME | End: 2019-08-28
Payer: COMMERCIAL

## 2019-08-28 DIAGNOSIS — I10 ESSENTIAL HYPERTENSION: ICD-10-CM

## 2019-08-28 LAB
ALBUMIN SERPL-MCNC: 4.7 G/DL (ref 3.5–5.2)
ALBUMIN/GLOBULIN RATIO: ABNORMAL (ref 1–2.5)
ALP BLD-CCNC: 60 U/L (ref 35–104)
ALT SERPL-CCNC: 36 U/L (ref 5–33)
ANION GAP SERPL CALCULATED.3IONS-SCNC: 12 MMOL/L (ref 9–17)
AST SERPL-CCNC: 21 U/L
BILIRUB SERPL-MCNC: 0.31 MG/DL (ref 0.3–1.2)
BUN BLDV-MCNC: 11 MG/DL (ref 6–20)
BUN/CREAT BLD: ABNORMAL (ref 9–20)
CALCIUM SERPL-MCNC: 9.6 MG/DL (ref 8.6–10.4)
CHLORIDE BLD-SCNC: 103 MMOL/L (ref 98–107)
CHOLESTEROL/HDL RATIO: 4.8
CHOLESTEROL: 139 MG/DL
CO2: 23 MMOL/L (ref 20–31)
CREAT SERPL-MCNC: 0.56 MG/DL (ref 0.5–0.9)
GFR AFRICAN AMERICAN: >60 ML/MIN
GFR NON-AFRICAN AMERICAN: >60 ML/MIN
GFR SERPL CREATININE-BSD FRML MDRD: ABNORMAL ML/MIN/{1.73_M2}
GFR SERPL CREATININE-BSD FRML MDRD: ABNORMAL ML/MIN/{1.73_M2}
GLUCOSE BLD-MCNC: 83 MG/DL (ref 70–99)
HDLC SERPL-MCNC: 29 MG/DL
LDL CHOLESTEROL: 89 MG/DL (ref 0–130)
POTASSIUM SERPL-SCNC: 4.3 MMOL/L (ref 3.7–5.3)
SODIUM BLD-SCNC: 138 MMOL/L (ref 135–144)
TOTAL PROTEIN: 8 G/DL (ref 6.4–8.3)
TRIGL SERPL-MCNC: 106 MG/DL
VLDLC SERPL CALC-MCNC: ABNORMAL MG/DL (ref 1–30)

## 2019-08-28 PROCEDURE — 80053 COMPREHEN METABOLIC PANEL: CPT

## 2019-08-28 PROCEDURE — 80061 LIPID PANEL: CPT

## 2019-08-28 PROCEDURE — 36415 COLL VENOUS BLD VENIPUNCTURE: CPT

## 2019-08-29 ENCOUNTER — OFFICE VISIT (OUTPATIENT)
Dept: FAMILY MEDICINE CLINIC | Age: 29
End: 2019-08-29
Payer: COMMERCIAL

## 2019-08-29 ENCOUNTER — TELEPHONE (OUTPATIENT)
Dept: OBGYN CLINIC | Age: 29
End: 2019-08-29

## 2019-08-29 VITALS
BODY MASS INDEX: 45.99 KG/M2 | OXYGEN SATURATION: 96 % | HEART RATE: 100 BPM | HEIGHT: 67 IN | DIASTOLIC BLOOD PRESSURE: 80 MMHG | WEIGHT: 293 LBS | SYSTOLIC BLOOD PRESSURE: 138 MMHG

## 2019-08-29 DIAGNOSIS — I10 ESSENTIAL HYPERTENSION: Primary | ICD-10-CM

## 2019-08-29 DIAGNOSIS — E66.01 MORBID OBESITY WITH BMI OF 50.0-59.9, ADULT (HCC): ICD-10-CM

## 2019-08-29 PROCEDURE — 99213 OFFICE O/P EST LOW 20 MIN: CPT | Performed by: FAMILY MEDICINE

## 2019-08-29 PROCEDURE — 4004F PT TOBACCO SCREEN RCVD TLK: CPT | Performed by: FAMILY MEDICINE

## 2019-08-29 PROCEDURE — G8427 DOCREV CUR MEDS BY ELIG CLIN: HCPCS | Performed by: FAMILY MEDICINE

## 2019-08-29 PROCEDURE — G8417 CALC BMI ABV UP PARAM F/U: HCPCS | Performed by: FAMILY MEDICINE

## 2019-08-29 ASSESSMENT — ENCOUNTER SYMPTOMS
NAUSEA: 0
ABDOMINAL PAIN: 0
SHORTNESS OF BREATH: 0
SORE THROAT: 0

## 2019-08-29 NOTE — PROGRESS NOTES
Subjective:      Patient ID: Armin Mckeon is a 33 y.o. female. Visit Information    Have you changed or started any medications since your last visit including any over-the-counter medicines, vitamins, or herbal medicines? no   Are you having any side effects from any of your medications? -  no  Have you stopped taking any of your medications? Is so, why? -  no    Have you seen any other physician or provider since your last visit? No  Have you had any other diagnostic tests since your last visit? Yes - Records Obtained  Have you been seen in the emergency room and/or had an admission to a hospital since we last saw you? No  Have you had your routine dental cleaning in the past 6 months? no    Have you activated your [employer] account? If not, what are your barriers? No: declined      Patient Care Team:  Chelsy Prieto MD as PCP - General (Family Medicine)  Chelsy Prieto MD as PCP - [facility]    Medical History Review  Past Medical, Family, and Social History reviewed and does contribute to the patient presenting condition    Health Maintenance   Topic Date Due    Potassium monitoring  1991    Creatinine monitoring  1991    Pneumococcal 0-64 years Vaccine (1 of 1 - PPSV23) 09/24/1997    Varicella Vaccine (1 of 2 - 13+ 2-dose series) 09/24/2004    HIV screen  09/24/2006    DTaP/Tdap/Td vaccine (1 - Tdap) 09/24/2010    Flu vaccine (1) 09/01/2019    Cervical cancer screen  07/18/2020    HPV vaccine  Aged Out     HPI  81-year-old female is seen in the office today for follow-up for her hypertension was started on Zestril her blood pressure is controlled and she is also lost weight. She was on metformin from her OB/GYN for polycystic ovarian syndrome she stopped taking it she states she is feeling sick with it so I told her she needs to call her OB and let him know if he wants to prescribe some other medication  Review of Systems   Constitutional: Negative for appetite change. HENT: Negative for ear pain, sneezing and sore throat. Eyes: Negative for visual disturbance. Respiratory: Negative for shortness of breath. Cardiovascular: Negative for chest pain. Gastrointestinal: Negative for abdominal pain and nausea. Genitourinary: Negative for frequency and pelvic pain. Musculoskeletal: Negative for arthralgias. Neurological: Negative for dizziness and headaches. Objective:   Physical Exam   Constitutional: She is oriented to person, place, and time. She appears well-developed and well-nourished. /80   Pulse 100   Ht 5' 7\" (1.702 m)   Wt (!) 326 lb 12.8 oz (148.2 kg)   LMP 08/12/2019 (Approximate)   SpO2 96%   BMI 51.18 kg/m²    HENT:   Head: Normocephalic. Mouth/Throat: Oropharynx is clear and moist.        Eyes: Conjunctivae are normal.   Cardiovascular: Normal rate and regular rhythm. Pulmonary/Chest: Breath sounds normal. She has no rales. Abdominal: Soft. Bowel sounds are normal. There is no tenderness. Musculoskeletal: She exhibits no edema. Lymphadenopathy:     She has no cervical adenopathy. Neurological: She is alert and oriented to person, place, and time. Nursing note and vitals reviewed. Assessment:        Diagnosis Orders   1. Essential hypertension   controlled   2. Morbid obesity with BMI of 50.0-59.9, adult ([facility] [address]   improving             Plan:       No orders of the defined types were placed in this encounter. No orders of the defined types were placed in this encounter. Return in about 3 months (around 11/29/2019) for HTN.     Continue current medications reviewed from the chart              Cathleen Rothman MA

## 2019-09-18 ENCOUNTER — OFFICE VISIT (OUTPATIENT)
Dept: OBGYN CLINIC | Age: 29
End: 2019-09-18
Payer: COMMERCIAL

## 2019-09-18 VITALS
SYSTOLIC BLOOD PRESSURE: 134 MMHG | HEART RATE: 90 BPM | WEIGHT: 293 LBS | HEIGHT: 67 IN | DIASTOLIC BLOOD PRESSURE: 85 MMHG | BODY MASS INDEX: 45.99 KG/M2

## 2019-09-18 DIAGNOSIS — E66.01 MORBID OBESITY WITH BODY MASS INDEX OF 45.0-49.9 IN ADULT (HCC): ICD-10-CM

## 2019-09-18 DIAGNOSIS — E28.2 PCOS (POLYCYSTIC OVARIAN SYNDROME): Primary | ICD-10-CM

## 2019-09-18 PROCEDURE — G8427 DOCREV CUR MEDS BY ELIG CLIN: HCPCS | Performed by: SPECIALIST

## 2019-09-18 PROCEDURE — 99212 OFFICE O/P EST SF 10 MIN: CPT | Performed by: SPECIALIST

## 2019-09-18 PROCEDURE — G8417 CALC BMI ABV UP PARAM F/U: HCPCS | Performed by: SPECIALIST

## 2019-09-18 PROCEDURE — 4004F PT TOBACCO SCREEN RCVD TLK: CPT | Performed by: SPECIALIST

## 2019-09-18 ASSESSMENT — ENCOUNTER SYMPTOMS
DIARRHEA: 0
ABDOMINAL PAIN: 0
EYE PAIN: 0
VOMITING: 0
APNEA: 0
ABDOMINAL DISTENTION: 0
COUGH: 0
CONSTIPATION: 0
NAUSEA: 0

## 2019-09-19 RX ORDER — ENALAPRIL MALEATE 2.5 MG/1
2.5 TABLET ORAL DAILY
Qty: 30 TABLET | Refills: 1 | Status: SHIPPED | OUTPATIENT
Start: 2019-09-19 | End: 2019-10-04 | Stop reason: SDUPTHER

## 2019-09-19 RX ORDER — LISINOPRIL 2.5 MG/1
2.5 TABLET ORAL DAILY
Qty: 30 TABLET | Refills: 1 | Status: CANCELLED | OUTPATIENT
Start: 2019-09-19

## 2019-09-19 NOTE — TELEPHONE ENCOUNTER
Please Approve or Refuse.   Send to Pharmacy per Pt's Request:      Next Visit Date:  12/3/2019   Last Visit Date: 8/29/2019    No results found for: LABA1C          ( goal A1C is < 7)   BP Readings from Last 3 Encounters:   09/18/19 134/85   08/29/19 138/80   08/22/19 (!) 140/89          (goal 120/80)  BUN   Date Value Ref Range Status   08/28/2019 11 6 - 20 mg/dL Final     CREATININE   Date Value Ref Range Status   08/28/2019 0.56 0.50 - 0.90 mg/dL Final     Potassium   Date Value Ref Range Status   08/28/2019 4.3 3.7 - 5.3 mmol/L Final

## 2019-10-03 ENCOUNTER — OFFICE VISIT (OUTPATIENT)
Dept: OBGYN CLINIC | Age: 29
End: 2019-10-03
Payer: COMMERCIAL

## 2019-10-03 VITALS
HEART RATE: 87 BPM | WEIGHT: 293 LBS | DIASTOLIC BLOOD PRESSURE: 80 MMHG | SYSTOLIC BLOOD PRESSURE: 129 MMHG | BODY MASS INDEX: 45.99 KG/M2 | HEIGHT: 67 IN

## 2019-10-03 DIAGNOSIS — Z87.42 HISTORY OF OVARIAN CYST: Primary | ICD-10-CM

## 2019-10-03 DIAGNOSIS — E66.01 MORBID OBESITY WITH BMI OF 45.0-49.9, ADULT (HCC): ICD-10-CM

## 2019-10-03 DIAGNOSIS — N94.6 DYSMENORRHEA: ICD-10-CM

## 2019-10-03 PROCEDURE — 99213 OFFICE O/P EST LOW 20 MIN: CPT | Performed by: SPECIALIST

## 2019-10-03 PROCEDURE — G8484 FLU IMMUNIZE NO ADMIN: HCPCS | Performed by: SPECIALIST

## 2019-10-03 PROCEDURE — G8427 DOCREV CUR MEDS BY ELIG CLIN: HCPCS | Performed by: SPECIALIST

## 2019-10-03 PROCEDURE — G8417 CALC BMI ABV UP PARAM F/U: HCPCS | Performed by: SPECIALIST

## 2019-10-03 PROCEDURE — 4004F PT TOBACCO SCREEN RCVD TLK: CPT | Performed by: SPECIALIST

## 2019-10-03 RX ORDER — LISINOPRIL 2.5 MG/1
TABLET ORAL
Refills: 1 | COMMUNITY
Start: 2019-09-19 | End: 2019-10-04 | Stop reason: SDUPTHER

## 2019-10-03 RX ORDER — IBUPROFEN 800 MG/1
800 TABLET ORAL EVERY 6 HOURS PRN
Qty: 28 TABLET | Refills: 1 | Status: SHIPPED | OUTPATIENT
Start: 2019-10-03 | End: 2020-08-20

## 2019-10-03 ASSESSMENT — ENCOUNTER SYMPTOMS
NAUSEA: 0
CONSTIPATION: 0
COUGH: 0
EYE PAIN: 0
VOMITING: 0
APNEA: 0
ABDOMINAL PAIN: 0
ABDOMINAL DISTENTION: 0
DIARRHEA: 0

## 2019-10-04 RX ORDER — LISINOPRIL 2.5 MG/1
2.5 TABLET ORAL DAILY
Qty: 30 TABLET | Refills: 1 | Status: CANCELLED | OUTPATIENT
Start: 2019-10-04

## 2019-10-04 RX ORDER — ENALAPRIL MALEATE 2.5 MG/1
2.5 TABLET ORAL DAILY
Qty: 30 TABLET | Refills: 1 | Status: SHIPPED | OUTPATIENT
Start: 2019-10-04 | End: 2019-10-15 | Stop reason: SDUPTHER

## 2019-10-15 RX ORDER — ENALAPRIL MALEATE 2.5 MG/1
2.5 TABLET ORAL DAILY
Qty: 30 TABLET | Refills: 3 | Status: SHIPPED | OUTPATIENT
Start: 2019-10-15 | End: 2019-10-17 | Stop reason: SDUPTHER

## 2019-10-15 RX ORDER — LISINOPRIL 2.5 MG/1
2.5 TABLET ORAL DAILY
Qty: 30 TABLET | Refills: 1 | Status: CANCELLED | OUTPATIENT
Start: 2019-10-15

## 2019-10-17 RX ORDER — ENALAPRIL MALEATE 2.5 MG/1
2.5 TABLET ORAL DAILY
Qty: 30 TABLET | Refills: 1 | Status: SHIPPED | OUTPATIENT
Start: 2019-10-17 | End: 2020-04-03

## 2019-10-17 RX ORDER — LISINOPRIL 2.5 MG/1
2.5 TABLET ORAL DAILY
Qty: 30 TABLET | Refills: 1 | Status: CANCELLED | OUTPATIENT
Start: 2019-10-17

## 2020-03-01 ENCOUNTER — HOSPITAL ENCOUNTER (OUTPATIENT)
Age: 30
Setting detail: OBSERVATION
Discharge: HOME | DRG: 113 | End: 2020-03-02
Attending: EMERGENCY MEDICINE | Admitting: INTERNAL MEDICINE
Payer: COMMERCIAL

## 2020-03-01 ENCOUNTER — APPOINTMENT (OUTPATIENT)
Dept: CT IMAGING | Age: 30
DRG: 113 | End: 2020-03-01
Payer: COMMERCIAL

## 2020-03-01 PROBLEM — J02.9 PHARYNGITIS, ACUTE: Status: ACTIVE | Noted: 2020-03-01

## 2020-03-01 PROBLEM — J02.0 ACUTE STREPTOCOCCAL PHARYNGITIS: Status: ACTIVE | Noted: 2020-03-01

## 2020-03-01 LAB
ABSOLUTE BANDS #: 1.55 K/UL (ref 0–1)
ABSOLUTE EOS #: 0 K/UL (ref 0–0.4)
ABSOLUTE IMMATURE GRANULOCYTE: ABNORMAL K/UL (ref 0–0.3)
ABSOLUTE LYMPH #: 2.06 K/UL (ref 1–4.8)
ABSOLUTE MONO #: 2.58 K/UL (ref 0.1–1.3)
ALBUMIN SERPL-MCNC: 4.1 G/DL (ref 3.5–5.2)
ALBUMIN/GLOBULIN RATIO: ABNORMAL (ref 1–2.5)
ALP BLD-CCNC: 58 U/L (ref 35–104)
ALT SERPL-CCNC: 14 U/L (ref 5–33)
ANION GAP SERPL CALCULATED.3IONS-SCNC: 17 MMOL/L (ref 9–17)
AST SERPL-CCNC: 11 U/L
BANDS: 6 % (ref 0–10)
BASOPHILS # BLD: 0 % (ref 0–2)
BASOPHILS ABSOLUTE: 0 K/UL (ref 0–0.2)
BILIRUB SERPL-MCNC: 0.53 MG/DL (ref 0.3–1.2)
BUN BLDV-MCNC: 9 MG/DL (ref 6–20)
BUN/CREAT BLD: ABNORMAL (ref 9–20)
CALCIUM SERPL-MCNC: 9.2 MG/DL (ref 8.6–10.4)
CHLORIDE BLD-SCNC: 94 MMOL/L (ref 98–107)
CO2: 22 MMOL/L (ref 20–31)
CREAT SERPL-MCNC: 0.69 MG/DL (ref 0.5–0.9)
DIFFERENTIAL TYPE: ABNORMAL
DIRECT EXAM: ABNORMAL
EOSINOPHILS RELATIVE PERCENT: 0 % (ref 0–4)
GFR AFRICAN AMERICAN: >60 ML/MIN
GFR NON-AFRICAN AMERICAN: >60 ML/MIN
GFR SERPL CREATININE-BSD FRML MDRD: ABNORMAL ML/MIN/{1.73_M2}
GFR SERPL CREATININE-BSD FRML MDRD: ABNORMAL ML/MIN/{1.73_M2}
GLUCOSE BLD-MCNC: 123 MG/DL (ref 70–99)
HCT VFR BLD CALC: 38.3 % (ref 36–46)
HEMOGLOBIN: 12.7 G/DL (ref 12–16)
IMMATURE GRANULOCYTES: ABNORMAL %
INR BLD: 1.2
INR BLD: 1.2
LACTIC ACID, SEPSIS WHOLE BLOOD: NORMAL MMOL/L (ref 0.5–1.9)
LACTIC ACID, SEPSIS: 1.4 MMOL/L (ref 0.5–1.9)
LYMPHOCYTES # BLD: 8 % (ref 24–44)
Lab: ABNORMAL
MAGNESIUM: 1.8 MG/DL (ref 1.6–2.6)
MCH RBC QN AUTO: 29.5 PG (ref 26–34)
MCHC RBC AUTO-ENTMCNC: 33.1 G/DL (ref 31–37)
MCV RBC AUTO: 89.1 FL (ref 80–100)
MONOCYTES # BLD: 10 % (ref 1–7)
MORPHOLOGY: ABNORMAL
NRBC AUTOMATED: ABNORMAL PER 100 WBC
PDW BLD-RTO: 14 % (ref 11.5–14.9)
PLATELET # BLD: 232 K/UL (ref 150–450)
PLATELET ESTIMATE: ABNORMAL
PMV BLD AUTO: 9.6 FL (ref 6–12)
POTASSIUM SERPL-SCNC: 3.4 MMOL/L (ref 3.7–5.3)
PROTHROMBIN TIME: 15.2 SEC (ref 11.8–14.6)
PROTHROMBIN TIME: 15.4 SEC (ref 11.8–14.6)
RBC # BLD: 4.3 M/UL (ref 4–5.2)
RBC # BLD: ABNORMAL 10*6/UL
SEG NEUTROPHILS: 76 % (ref 36–66)
SEGMENTED NEUTROPHILS ABSOLUTE COUNT: 19.61 K/UL (ref 1.3–9.1)
SODIUM BLD-SCNC: 133 MMOL/L (ref 135–144)
SPECIMEN DESCRIPTION: ABNORMAL
TOTAL PROTEIN: 8.1 G/DL (ref 6.4–8.3)
WBC # BLD: 25.8 K/UL (ref 3.5–11)
WBC # BLD: ABNORMAL 10*3/UL

## 2020-03-01 PROCEDURE — 85025 COMPLETE CBC W/AUTO DIFF WBC: CPT

## 2020-03-01 PROCEDURE — 85610 PROTHROMBIN TIME: CPT

## 2020-03-01 PROCEDURE — 83605 ASSAY OF LACTIC ACID: CPT

## 2020-03-01 PROCEDURE — G0378 HOSPITAL OBSERVATION PER HR: HCPCS

## 2020-03-01 PROCEDURE — 70491 CT SOFT TISSUE NECK W/DYE: CPT

## 2020-03-01 PROCEDURE — 96365 THER/PROPH/DIAG IV INF INIT: CPT

## 2020-03-01 PROCEDURE — 87040 BLOOD CULTURE FOR BACTERIA: CPT

## 2020-03-01 PROCEDURE — 83735 ASSAY OF MAGNESIUM: CPT

## 2020-03-01 PROCEDURE — 2580000003 HC RX 258: Performed by: EMERGENCY MEDICINE

## 2020-03-01 PROCEDURE — 87880 STREP A ASSAY W/OPTIC: CPT

## 2020-03-01 PROCEDURE — 6360000002 HC RX W HCPCS: Performed by: PHYSICIAN ASSISTANT

## 2020-03-01 PROCEDURE — 96375 TX/PRO/DX INJ NEW DRUG ADDON: CPT

## 2020-03-01 PROCEDURE — 2580000003 HC RX 258: Performed by: PHYSICIAN ASSISTANT

## 2020-03-01 PROCEDURE — 6370000000 HC RX 637 (ALT 250 FOR IP): Performed by: STUDENT IN AN ORGANIZED HEALTH CARE EDUCATION/TRAINING PROGRAM

## 2020-03-01 PROCEDURE — 99285 EMERGENCY DEPT VISIT HI MDM: CPT

## 2020-03-01 PROCEDURE — 36415 COLL VENOUS BLD VENIPUNCTURE: CPT

## 2020-03-01 PROCEDURE — 81001 URINALYSIS AUTO W/SCOPE: CPT

## 2020-03-01 PROCEDURE — 2580000003 HC RX 258: Performed by: STUDENT IN AN ORGANIZED HEALTH CARE EDUCATION/TRAINING PROGRAM

## 2020-03-01 PROCEDURE — 6360000004 HC RX CONTRAST MEDICATION: Performed by: EMERGENCY MEDICINE

## 2020-03-01 PROCEDURE — 96376 TX/PRO/DX INJ SAME DRUG ADON: CPT

## 2020-03-01 PROCEDURE — 96366 THER/PROPH/DIAG IV INF ADDON: CPT

## 2020-03-01 PROCEDURE — 80053 COMPREHEN METABOLIC PANEL: CPT

## 2020-03-01 PROCEDURE — 6360000002 HC RX W HCPCS: Performed by: STUDENT IN AN ORGANIZED HEALTH CARE EDUCATION/TRAINING PROGRAM

## 2020-03-01 PROCEDURE — 2060000000 HC ICU INTERMEDIATE R&B

## 2020-03-01 PROCEDURE — 99223 1ST HOSP IP/OBS HIGH 75: CPT | Performed by: INTERNAL MEDICINE

## 2020-03-01 RX ORDER — SODIUM CHLORIDE 0.9 % (FLUSH) 0.9 %
10 SYRINGE (ML) INJECTION EVERY 12 HOURS SCHEDULED
Status: DISCONTINUED | OUTPATIENT
Start: 2020-03-01 | End: 2020-03-02 | Stop reason: HOSPADM

## 2020-03-01 RX ORDER — SODIUM CHLORIDE 0.9 % (FLUSH) 0.9 %
10 SYRINGE (ML) INJECTION ONCE
Status: COMPLETED | OUTPATIENT
Start: 2020-03-01 | End: 2020-03-01

## 2020-03-01 RX ORDER — POTASSIUM CHLORIDE 7.45 MG/ML
10 INJECTION INTRAVENOUS PRN
Status: DISCONTINUED | OUTPATIENT
Start: 2020-03-01 | End: 2020-03-02 | Stop reason: HOSPADM

## 2020-03-01 RX ORDER — KETOROLAC TROMETHAMINE 30 MG/ML
30 INJECTION, SOLUTION INTRAMUSCULAR; INTRAVENOUS EVERY 6 HOURS PRN
Status: DISCONTINUED | OUTPATIENT
Start: 2020-03-01 | End: 2020-03-02 | Stop reason: HOSPADM

## 2020-03-01 RX ORDER — 0.9 % SODIUM CHLORIDE 0.9 %
1000 INTRAVENOUS SOLUTION INTRAVENOUS ONCE
Status: COMPLETED | OUTPATIENT
Start: 2020-03-01 | End: 2020-03-01

## 2020-03-01 RX ORDER — ACETAMINOPHEN 325 MG/1
650 TABLET ORAL EVERY 6 HOURS PRN
Status: DISCONTINUED | OUTPATIENT
Start: 2020-03-01 | End: 2020-03-02 | Stop reason: HOSPADM

## 2020-03-01 RX ORDER — POLYETHYLENE GLYCOL 3350 17 G/17G
17 POWDER, FOR SOLUTION ORAL DAILY PRN
Status: DISCONTINUED | OUTPATIENT
Start: 2020-03-01 | End: 2020-03-02 | Stop reason: HOSPADM

## 2020-03-01 RX ORDER — SODIUM CHLORIDE 0.9 % (FLUSH) 0.9 %
10 SYRINGE (ML) INJECTION PRN
Status: DISCONTINUED | OUTPATIENT
Start: 2020-03-01 | End: 2020-03-02 | Stop reason: HOSPADM

## 2020-03-01 RX ORDER — DEXAMETHASONE SODIUM PHOSPHATE 10 MG/ML
10 INJECTION, SOLUTION INTRAMUSCULAR; INTRAVENOUS ONCE
Status: COMPLETED | OUTPATIENT
Start: 2020-03-01 | End: 2020-03-01

## 2020-03-01 RX ORDER — ONDANSETRON 2 MG/ML
4 INJECTION INTRAMUSCULAR; INTRAVENOUS EVERY 6 HOURS PRN
Status: DISCONTINUED | OUTPATIENT
Start: 2020-03-01 | End: 2020-03-02 | Stop reason: HOSPADM

## 2020-03-01 RX ORDER — KETOROLAC TROMETHAMINE 30 MG/ML
30 INJECTION, SOLUTION INTRAMUSCULAR; INTRAVENOUS ONCE
Status: COMPLETED | OUTPATIENT
Start: 2020-03-01 | End: 2020-03-01

## 2020-03-01 RX ORDER — ACETAMINOPHEN 650 MG/1
650 SUPPOSITORY RECTAL EVERY 6 HOURS PRN
Status: DISCONTINUED | OUTPATIENT
Start: 2020-03-01 | End: 2020-03-02 | Stop reason: HOSPADM

## 2020-03-01 RX ORDER — POTASSIUM CHLORIDE 20 MEQ/1
40 TABLET, EXTENDED RELEASE ORAL PRN
Status: DISCONTINUED | OUTPATIENT
Start: 2020-03-01 | End: 2020-03-02 | Stop reason: HOSPADM

## 2020-03-01 RX ORDER — PROMETHAZINE HYDROCHLORIDE 25 MG/1
12.5 TABLET ORAL EVERY 6 HOURS PRN
Status: DISCONTINUED | OUTPATIENT
Start: 2020-03-01 | End: 2020-03-02 | Stop reason: HOSPADM

## 2020-03-01 RX ORDER — DEXAMETHASONE SODIUM PHOSPHATE 10 MG/ML
10 INJECTION, SOLUTION INTRAMUSCULAR; INTRAVENOUS EVERY 8 HOURS
Status: DISCONTINUED | OUTPATIENT
Start: 2020-03-01 | End: 2020-03-02 | Stop reason: HOSPADM

## 2020-03-01 RX ORDER — 0.9 % SODIUM CHLORIDE 0.9 %
80 INTRAVENOUS SOLUTION INTRAVENOUS ONCE
Status: COMPLETED | OUTPATIENT
Start: 2020-03-01 | End: 2020-03-01

## 2020-03-01 RX ADMIN — Medication 10 ML: at 12:50

## 2020-03-01 RX ADMIN — SODIUM CHLORIDE 1000 ML: 9 INJECTION, SOLUTION INTRAVENOUS at 11:29

## 2020-03-01 RX ADMIN — AMPICILLIN SODIUM AND SULBACTAM SODIUM 3 G: 2; 1 INJECTION, POWDER, FOR SOLUTION INTRAMUSCULAR; INTRAVENOUS at 22:29

## 2020-03-01 RX ADMIN — IOVERSOL 75 ML: 741 INJECTION INTRA-ARTERIAL; INTRAVENOUS at 12:50

## 2020-03-01 RX ADMIN — ENOXAPARIN SODIUM 30 MG: 30 INJECTION SUBCUTANEOUS at 22:28

## 2020-03-01 RX ADMIN — DEXAMETHASONE SODIUM PHOSPHATE 10 MG: 10 INJECTION, SOLUTION INTRAMUSCULAR; INTRAVENOUS at 11:35

## 2020-03-01 RX ADMIN — SODIUM CHLORIDE 80 ML: 9 INJECTION, SOLUTION INTRAVENOUS at 12:50

## 2020-03-01 RX ADMIN — KETOROLAC TROMETHAMINE 30 MG: 30 INJECTION, SOLUTION INTRAMUSCULAR; INTRAVENOUS at 11:36

## 2020-03-01 RX ADMIN — Medication 10 ML: at 22:30

## 2020-03-01 RX ADMIN — POTASSIUM CHLORIDE 40 MEQ: 1500 TABLET, EXTENDED RELEASE ORAL at 22:28

## 2020-03-01 RX ADMIN — DEXAMETHASONE SODIUM PHOSPHATE 10 MG: 10 INJECTION, SOLUTION INTRAMUSCULAR; INTRAVENOUS at 14:52

## 2020-03-01 RX ADMIN — AMPICILLIN SODIUM AND SULBACTAM SODIUM 3 G: 2; 1 INJECTION, POWDER, FOR SOLUTION INTRAMUSCULAR; INTRAVENOUS at 13:03

## 2020-03-01 ASSESSMENT — ENCOUNTER SYMPTOMS
ABDOMINAL DISTENTION: 0
DIARRHEA: 0
SORE THROAT: 1
VOICE CHANGE: 1
COUGH: 0
NAUSEA: 0
VOMITING: 0
SHORTNESS OF BREATH: 0
BACK PAIN: 0
COLOR CHANGE: 0
ABDOMINAL PAIN: 0
TROUBLE SWALLOWING: 1

## 2020-03-01 ASSESSMENT — PAIN SCALES - GENERAL
PAINLEVEL_OUTOF10: 8
PAINLEVEL_OUTOF10: 5
PAINLEVEL_OUTOF10: 8

## 2020-03-01 ASSESSMENT — PAIN DESCRIPTION - LOCATION: LOCATION: THROAT

## 2020-03-01 NOTE — ED NOTES
Pt askinf if she can just go home. AMA explained to pt, recommend staying for further tx. Pt agrees.       Iker Dinh RN  03/01/20 [id number]

## 2020-03-01 NOTE — ED NOTES
Dr Dori Jefferson and residents at bedside. Dr Dori Jefferson assessed pt. Dr Dori Jefferson gave verbal order to keep pt NPO at this time.     Kathy Casillas RN  03/01/20 [address] Liudmila Rogers RN  03/01/20 [id number]

## 2020-03-01 NOTE — H&P
[address] [facility]     HISTORY AND PHYSICAL EXAMINATION            Date:   3/1/2020  Patient name:  Maldonado Lira  Date of admission:  3/1/2020 10:56 AM  MRN:   [id number]  Account:  [de-identified]  YOB: 1990  PCP:    Anna Lancatser MD  Room:   12/12  Code Status:    No Order    Chief Complaint:     Chief Complaint   Patient presents with    Pharyngitis       History Obtained From:     patient    History of Present Illness: The patient is a 30 y.o. Non-/non  female who presents withPharyngitis   and she is admitted to the hospital for the management of acute streptococcal pharyngitis. Patient with past medical history of morbid obesity, obstructive sleep apnea who presents to the ED onset 3/1 with chief complaint of sore throat for the past 3 days. Patient says it is painful to swallow, her voice sounds muffled. She has not tried eating any food due to the pain. Patient stating she has some subjective fevers and chills but otherwise denies abdominal pain, nausea, vomiting, chest pain, shortness of breath. In the ED strep swab was positive for Streptococcus A. CT showing Diffuse posterior pharyngeal soft tissue swelling. Bilateral moderate cervical lymphadenopathy.  [place] tonsils are enlarged without abscess. Findings compatible with pharyngitis and tonsillitis. There is leukocytosis of 25.8. Past Medical History:     Past Medical History:   Diagnosis Date    Essential hypertension 8/22/2019    H/O trichomonal vaginitis     Obesity     NEDA (obstructive sleep apnea) 1/25/2018        Past SurgicalHistory:     Past Surgical History:   Procedure Laterality Date    MOUTH SURGERY          Medications Prior to Admission:        Prior to Admission medications    Medication Sig Start Date End Date Taking? 72 hours. No intake or output data in the 24 hours ending 03/01/20 1630    Physical Exam  Constitutional:       Appearance: She is obese. She is diaphoretic. HENT:      Head: Normocephalic and atraumatic. Mouth/Throat:      Pharynx: Pharyngeal swelling, oropharyngeal exudate and posterior oropharyngeal erythema present. Tonsils: Tonsillar exudate present. Eyes:      Conjunctiva/sclera: Conjunctivae normal.      Pupils: Pupils are equal, round, and reactive to light. Cardiovascular:      Rate and Rhythm: Normal rate and regular rhythm. Heart sounds: Normal heart sounds. Pulmonary:      Effort: Pulmonary effort is normal.      Breath sounds: Normal breath sounds. Abdominal:      General: Bowel sounds are normal.      Palpations: Abdomen is soft. Musculoskeletal: Normal range of motion. Lymphadenopathy:      Cervical: Cervical adenopathy present. Skin:     General: Skin is warm. Capillary Refill: Capillary refill takes less than 2 seconds. Neurological:      Mental Status: She is alert and oriented to person, place, and time. Psychiatric:         Behavior: Behavior normal.         Thought Content:  Thought content normal.         Investigations:     Laboratory Testing:  Recent Results (from the past 24 hour(s))   CBC Auto Differential    Collection Time: 03/01/20 11:13 AM   Result Value Ref Range    WBC 25.8 (H) 3.5 - 11.0 k/uL    RBC 4.30 4.0 - 5.2 m/uL    Hemoglobin 12.7 12.0 - 16.0 g/dL    Hematocrit 38.3 36 - 46 %    MCV 89.1 80 - 100 fL    MCH 29.5 26 - 34 pg    MCHC 33.1 31 - 37 g/dL    RDW 14.0 11.5 - 14.9 %    Platelets 239 652 - 059 k/uL    MPV 9.6 6.0 - 12.0 fL    NRBC Automated NOT REPORTED per 100 WBC    Differential Type NOT REPORTED     Immature Granulocytes NOT REPORTED 0 %    Absolute Immature Granulocyte NOT REPORTED 0.00 - 0.30 k/uL    WBC Morphology NOT REPORTED     RBC Morphology NOT REPORTED     Platelet Estimate NOT REPORTED     Seg Neutrophils 76 (H) 36 - Specimen Description . THROAT     Special Requests NOT REPORTED     Direct Exam POSITIVE for Group A Streptococci (A)        Imaging/Diagnostics:  Ct Soft Tissue Neck W Contrast    Result Date: 3/1/2020  EXAMINATION: CT OF THE NECK SOFT TISSUE WITH CONTRAST  3/1/2020 TECHNIQUE: CT of the neck was performed with the administration of intravenous contrast. Multiplanar reformatted images are provided for review. Dose modulation, iterative reconstruction, and/or weight based adjustment of the mA/kV was utilized to reduce the radiation dose to as low as reasonably achievable. COMPARISON: None. HISTORY: ORDERING SYSTEM PROVIDED HISTORY: sore throat, swelling. TECHNOLOGIST PROVIDED HISTORY: sore throat, swelling. Reason for Exam: throat swelling, pain and trouble talking for 3 days, has a fever Acuity: Acute Type of Exam: Initial FINDINGS: PHARYNX/LARYNX:  Diffuse soft tissue swelling in the posterior pharynx is noted without asymmetry. No palatine tonsillar mass. The tongue is normal in appearance. The valleculae, epiglottis, aryepiglottic folds and pyriform sinuses appear unremarkable. The true and false vocal cords are normal in appearance. No mass or abscess is seen. SALIVARY GLANDS/THYROID:  The parotid and submandibular glands appear unremarkable. The thyroid gland appears unremarkable. LYMPH NODES: Moderate bilateral anterior cervical lymphadenopathy is noted with lymph nodes averaging 11-16 mm. There are lymph nodes just inferior to the parotid glands, within normal limits. Shotty left lower cervical lymph nodes level 6 are noted. SOFT TISSUES:  Diffuse posterior pharyngeal soft tissue swelling. Included lung apices are unremarkable. BRAIN/ORBITS/SINUSES:  The visualized portion of the intracranial contents appear unremarkable. The visualized portion of the orbits, and mastoid air cells demonstrate no acute abnormality. Mild to moderate maxillary and ethmoid sinus inflammation is present.  LUNG APICES/SUPERIOR MEDIASTINUM:  No focal consolidation is seen within the visualized lung apices. No superior mediastinal lymphadenopathy or mass. The visualized portion of the trachea appears unremarkable. BONES:  No aggressive appearing lytic or blastic bony lesion. Diffuse posterior pharyngeal soft tissue swelling. Bilateral moderate cervical lymphadenopathy. [place] tonsils are enlarged without abscess. Findings compatible with pharyngitis and tonsillitis. Assessment :      Primary Problem  Acute streptococcal pharyngitis    Active Hospital Problems    Diagnosis Date Noted    Pharyngitis, acute [J02.9] 03/01/2020    Acute streptococcal pharyngitis [J02.0] 03/01/2020    Morbid obesity with BMI of 50.0-59.9, adult ([facility] [address] [E66.01, Z68.43] 08/22/2019    Essential hypertension [I10] 08/22/2019    NEDA (obstructive sleep apnea) [G47.33] 01/25/2018       Plan:     Patient status Admit as inpatient in the  Medical ICU    Acute streptococcal pharyngitis/tonsillitis with risk for airway compromise, Hx of NEDA  -Positive strep screen for Streptococcus a  -CT neck: Diffuse posterior pharyngeal soft tissue swelling. Bilateral moderate cervical lymphadenopathy.  [place] tonsils are enlarged without abscess.    -WBC 25.8  -Blood cultures pending  -IV Decadron 10 mg every 8 hours  -IV Unasyn 3 g every 6 hours  -Toradol 30 every 6 as needed for pain, Tylenol for fever  -NPO effective now  -Close monitoring of SPO2/airway, CPAP at night for NEDA  -RT consulted, eval and treat  -ENT consulted, input appreciated    Diet  -N.p.o. effective now    DVT prophylaxis  -Lovenox 40 subcu daily    PT/OT  -Assess and treat    Dispo  -Social work consulted    Consultations:   IP CONSULT TO INTERNAL MEDICINE  IP CONSULT TO OTOLARYNGOLOGY  IP CONSULT TO SOCIAL WORK    Patient is admitted as inpatient status because of co-morbiditieslisted above, severity of signs and symptoms as outlined, requirement for current medical therapies and most importantly because of direct risk to patient if care not provided in a hospital setting.     Shabbir Han MD  3/1/2020  4:30 PM    Copy sent to Dr. Vicky Torrez MD

## 2020-03-01 NOTE — ED PROVIDER NOTES
[address]  eMERGENCY dEPARTMENT eNCOUnter      Pt Name: Herbie Cleary  MRN: [id number]  [place] 1990  Date of evaluation: 3/1/2020  Provider: ADRIANNA Martin    CHIEF COMPLAINT       Chief Complaint   Patient presents with    Pharyngitis           HISTORY OF PRESENT ILLNESS  (Location/Symptom, Timing/Onset, Context/Setting, Quality, Duration, Modifying Factors, Severity.)   Herbie Cleary is a 30 y.o. female who presents to the emergency department complaining of a sore throat for the past 3 days. States it's painful to swallow and feels scratchy, states that her voice has changed and presents for evaluation. She denies any abdominal pain, nausea, vomiting, states that she has had some chills. Nursing Notes were reviewed. REVIEW OF SYSTEMS    (2-9 systems for level 4, 10 or more for level 5)     Review of Systems   Complaining of a sore throat  Denies cp, sob, abd pain, n/v    Except as noted above the remainder of the review of systems was reviewed and negative. PAST MEDICAL HISTORY     Past Medical History:   Diagnosis Date    Essential hypertension 8/22/2019    H/O trichomonal vaginitis     Obesity     NEDA (obstructive sleep apnea) 1/25/2018     None otherwise stated in nurses notes    SURGICAL HISTORY       Past Surgical History:   Procedure Laterality Date    MOUTH SURGERY       None otherwise stated in nurses notes    CURRENT MEDICATIONS       Previous Medications    ENALAPRIL (VASOTEC) 2.5 MG TABLET    Take 1 tablet by mouth daily    IBUPROFEN (ADVIL;MOTRIN) 800 MG TABLET    Take 1 tablet by mouth every 6 hours as needed for Pain    METFORMIN (GLUCOPHAGE) 1000 MG TABLET    Take 1 tablet by mouth 2 times daily (with meals)       ALLERGIES     Patient has no known allergies.     FAMILY HISTORY           Problem Relation Age of Onset    Mental Illness Mother    Christiana COPD Mother     No Known Problems Father      Family Status   Relation Name Status    Mother  Alive   Christiana without abscess. Findings compatible with pharyngitis and tonsillitis. LABS:  Labs Reviewed   STREP SCREEN GROUP A THROAT - Abnormal; Notable for the following components:       Result Value    Direct Exam POSITIVE for Group A Streptococci (*)     All other components within normal limits   CBC WITH AUTO DIFFERENTIAL - Abnormal; Notable for the following components:    WBC 25.8 (*)     Seg Neutrophils 76 (*)     Lymphocytes 8 (*)     Monocytes 10 (*)     Segs Absolute 19.61 (*)     Absolute Mono # 2.58 (*)     Absolute Bands # 1.55 (*)     All other components within normal limits   COMPREHENSIVE METABOLIC PANEL W/ REFLEX TO MG FOR LOW K - Abnormal; Notable for the following components:    Glucose 123 (*)     Sodium 133 (*)     Potassium 3.4 (*)     Chloride 94 (*)     All other components within normal limits   PROTIME-INR - Abnormal; Notable for the following components:    Protime 15.4 (*)     All other components within normal limits   CULTURE, BLOOD 1   CULTURE, BLOOD 1   LACTATE, SEPSIS   MAGNESIUM   PERIPHERAL BLOOD SMEAR, PATH REVIEW   URINE RT REFLEX TO CULTURE       All other labs were within normal range or not returned as of this dictation. EMERGENCY DEPARTMENT COURSE and DIFFERENTIAL DIAGNOSIS/MDM:   Vitals:    Vitals:    03/01/20 1145 03/01/20 1200 03/01/20 1424 03/01/20 1425   BP: 124/70 (!) 118/59     Pulse: 125 135 94    Resp: 17 19     Temp:   97.2 °F (36.2 °C)    TempSrc:   Oral    SpO2: 93% 97%  97%   Weight:       Height:         Labs, CT, ABX, decadron ordered here in the ER. Spoke with Dr. Albino Prather, hospitalist, who agrees to admit to ICU. Spoke with Dr. Mino Thompson, ent, who would like additional 10 mg of decadron here in ER and another 10mg in 8 hours and will see patient on the flood. Spoke with medicine resident about admission and decadron order around 10pm tonight. Pt stable, agrees to be admitted.        ED MEDS:  Orders Placed This Encounter   Medications    dexamethasone (PF) (DECADRON) injection 10 mg    ketorolac (TORADOL) injection 30 mg    0.9 % sodium chloride bolus    ampicillin-sulbactam (UNASYN) 3 g ivpb minibag    sodium chloride flush 0.9 % injection 10 mL    0.9 % sodium chloride bolus    ioversol (OPTIRAY) 74 % injection 75 mL    dexamethasone (PF) (DECADRON) injection 10 mg         CONSULTS:  IP CONSULT TO INTERNAL MEDICINE  IP CONSULT TO OTOLARYNGOLOGY  IP CONSULT TO SOCIAL WORK    PROCEDURES:  None      FINAL IMPRESSION      1. Acute pharyngitis, unspecified etiology          DISPOSITION/PLAN   DISPOSITION     PATIENT REFERRED TO:  No follow-up provider specified.     DISCHARGE MEDICATIONS:  New Prescriptions    No medications on file       (Please note that portions of this note were completed with a voice recognition program.  Efforts were made to edit the dictations but occasionally words are mis-transcribed.)    ADRIANNA Cohen PA  03/01/20 3032

## 2020-03-01 NOTE — ED NOTES
Report given to Pushpa Garcia from [facility]. Admit to room 2085  Report method by phone   The following was reviewed with receiving RN:   Current vital signs:  /85   Pulse 106   Temp 97.6 °F (36.4 °C) (Oral)   Resp 18   Ht 5' 7\" (1.702 m)   Wt 300 lb (136.1 kg)   SpO2 97%   BMI 46.99 kg/m²                MEWS Score: 2     Any medication or safety alerts were reviewed. Any pending diagnostics and notifications were also reviewed, as well as any safety concerns or issues, abnormal labs, abnormal imaging, and abnormal assessment findings. Questions were answered.             David Mcgill RN  03/01/20 8131

## 2020-03-02 VITALS
RESPIRATION RATE: 18 BRPM | HEIGHT: 67 IN | SYSTOLIC BLOOD PRESSURE: 137 MMHG | HEART RATE: 91 BPM | BODY MASS INDEX: 45.99 KG/M2 | WEIGHT: 293 LBS | OXYGEN SATURATION: 95 % | TEMPERATURE: 97.8 F | DIASTOLIC BLOOD PRESSURE: 81 MMHG

## 2020-03-02 LAB
-: ABNORMAL
ABSOLUTE BANDS #: 1.81 K/UL (ref 0–1)
ABSOLUTE EOS #: 0 K/UL (ref 0–0.4)
ABSOLUTE IMMATURE GRANULOCYTE: ABNORMAL K/UL (ref 0–0.3)
ABSOLUTE LYMPH #: 2.07 K/UL (ref 1–4.8)
ABSOLUTE MONO #: 0.52 K/UL (ref 0.1–1.3)
AMORPHOUS: ABNORMAL
ANION GAP SERPL CALCULATED.3IONS-SCNC: 13 MMOL/L (ref 9–17)
BACTERIA: ABNORMAL
BANDS: 7 % (ref 0–10)
BASOPHILS # BLD: 0 % (ref 0–2)
BASOPHILS ABSOLUTE: 0 K/UL (ref 0–0.2)
BILIRUBIN URINE: ABNORMAL
BUN BLDV-MCNC: 15 MG/DL (ref 6–20)
BUN/CREAT BLD: ABNORMAL (ref 9–20)
CALCIUM SERPL-MCNC: 9.2 MG/DL (ref 8.6–10.4)
CASTS UA: ABNORMAL /LPF
CHLORIDE BLD-SCNC: 100 MMOL/L (ref 98–107)
CO2: 23 MMOL/L (ref 20–31)
COLOR: ABNORMAL
COMMENT UA: ABNORMAL
CREAT SERPL-MCNC: 0.47 MG/DL (ref 0.5–0.9)
CRYSTALS, UA: ABNORMAL /HPF
DIFFERENTIAL TYPE: ABNORMAL
EOSINOPHILS RELATIVE PERCENT: 0 % (ref 0–4)
EPITHELIAL CELLS UA: ABNORMAL /HPF
GFR AFRICAN AMERICAN: >60 ML/MIN
GFR NON-AFRICAN AMERICAN: >60 ML/MIN
GFR SERPL CREATININE-BSD FRML MDRD: ABNORMAL ML/MIN/{1.73_M2}
GFR SERPL CREATININE-BSD FRML MDRD: ABNORMAL ML/MIN/{1.73_M2}
GLUCOSE BLD-MCNC: 152 MG/DL (ref 70–99)
GLUCOSE URINE: NEGATIVE
HCT VFR BLD CALC: 37.6 % (ref 36–46)
HEMOGLOBIN: 12.3 G/DL (ref 12–16)
IMMATURE GRANULOCYTES: ABNORMAL %
KETONES, URINE: ABNORMAL
LEUKOCYTE ESTERASE, URINE: NEGATIVE
LYMPHOCYTES # BLD: 8 % (ref 24–44)
MCH RBC QN AUTO: 29.5 PG (ref 26–34)
MCHC RBC AUTO-ENTMCNC: 32.9 G/DL (ref 31–37)
MCV RBC AUTO: 89.9 FL (ref 80–100)
MONOCYTES # BLD: 2 % (ref 1–7)
MORPHOLOGY: ABNORMAL
MUCUS: ABNORMAL
NITRITE, URINE: NEGATIVE
NRBC AUTOMATED: ABNORMAL PER 100 WBC
OTHER OBSERVATIONS UA: ABNORMAL
PATHOLOGIST REVIEW: NORMAL
PDW BLD-RTO: 14 % (ref 11.5–14.9)
PH UA: 6 (ref 5–8)
PLATELET # BLD: 245 K/UL (ref 150–450)
PLATELET ESTIMATE: ABNORMAL
PMV BLD AUTO: 10 FL (ref 6–12)
POTASSIUM SERPL-SCNC: 3.9 MMOL/L (ref 3.7–5.3)
PROTEIN UA: ABNORMAL
RBC # BLD: 4.18 M/UL (ref 4–5.2)
RBC # BLD: ABNORMAL 10*6/UL
RBC UA: ABNORMAL /HPF
RENAL EPITHELIAL, UA: ABNORMAL /HPF
SEG NEUTROPHILS: 83 % (ref 36–66)
SEGMENTED NEUTROPHILS ABSOLUTE COUNT: 21.5 K/UL (ref 1.3–9.1)
SODIUM BLD-SCNC: 136 MMOL/L (ref 135–144)
SPECIFIC GRAVITY UA: 1.06 (ref 1–1.03)
TRICHOMONAS: ABNORMAL
TURBIDITY: ABNORMAL
URINE HGB: ABNORMAL
UROBILINOGEN, URINE: NORMAL
WBC # BLD: 25.9 K/UL (ref 3.5–11)
WBC # BLD: ABNORMAL 10*3/UL
WBC UA: ABNORMAL /HPF
YEAST: ABNORMAL

## 2020-03-02 PROCEDURE — 6360000002 HC RX W HCPCS: Performed by: STUDENT IN AN ORGANIZED HEALTH CARE EDUCATION/TRAINING PROGRAM

## 2020-03-02 PROCEDURE — G0378 HOSPITAL OBSERVATION PER HR: HCPCS

## 2020-03-02 PROCEDURE — 94761 N-INVAS EAR/PLS OXIMETRY MLT: CPT

## 2020-03-02 PROCEDURE — 85025 COMPLETE CBC W/AUTO DIFF WBC: CPT

## 2020-03-02 PROCEDURE — 96372 THER/PROPH/DIAG INJ SC/IM: CPT

## 2020-03-02 PROCEDURE — 96376 TX/PRO/DX INJ SAME DRUG ADON: CPT

## 2020-03-02 PROCEDURE — 87086 URINE CULTURE/COLONY COUNT: CPT

## 2020-03-02 PROCEDURE — 2580000003 HC RX 258: Performed by: STUDENT IN AN ORGANIZED HEALTH CARE EDUCATION/TRAINING PROGRAM

## 2020-03-02 PROCEDURE — 36415 COLL VENOUS BLD VENIPUNCTURE: CPT

## 2020-03-02 PROCEDURE — 99239 HOSP IP/OBS DSCHRG MGMT >30: CPT | Performed by: INTERNAL MEDICINE

## 2020-03-02 PROCEDURE — 94660 CPAP INITIATION&MGMT: CPT

## 2020-03-02 PROCEDURE — 96366 THER/PROPH/DIAG IV INF ADDON: CPT

## 2020-03-02 PROCEDURE — 80048 BASIC METABOLIC PNL TOTAL CA: CPT

## 2020-03-02 RX ORDER — PREDNISONE 20 MG/1
TABLET ORAL
Qty: 35 TABLET | Refills: 0 | Status: SHIPPED | OUTPATIENT
Start: 2020-03-02 | End: 2020-03-17

## 2020-03-02 RX ORDER — AMOXICILLIN AND CLAVULANATE POTASSIUM 875; 125 MG/1; MG/1
1 TABLET, FILM COATED ORAL 2 TIMES DAILY
Qty: 20 TABLET | Refills: 0 | Status: SHIPPED | OUTPATIENT
Start: 2020-03-02 | End: 2020-03-12

## 2020-03-02 RX ADMIN — ENOXAPARIN SODIUM 30 MG: 30 INJECTION SUBCUTANEOUS at 08:18

## 2020-03-02 RX ADMIN — AMPICILLIN SODIUM AND SULBACTAM SODIUM 3 G: 2; 1 INJECTION, POWDER, FOR SOLUTION INTRAMUSCULAR; INTRAVENOUS at 04:05

## 2020-03-02 RX ADMIN — DEXAMETHASONE SODIUM PHOSPHATE 10 MG: 10 INJECTION, SOLUTION INTRAMUSCULAR; INTRAVENOUS at 00:04

## 2020-03-02 RX ADMIN — DEXAMETHASONE SODIUM PHOSPHATE 10 MG: 10 INJECTION, SOLUTION INTRAMUSCULAR; INTRAVENOUS at 06:37

## 2020-03-02 RX ADMIN — AMPICILLIN SODIUM AND SULBACTAM SODIUM 3 G: 2; 1 INJECTION, POWDER, FOR SOLUTION INTRAMUSCULAR; INTRAVENOUS at 10:45

## 2020-03-02 RX ADMIN — Medication 10 ML: at 08:17

## 2020-03-02 ASSESSMENT — ENCOUNTER SYMPTOMS
SHORTNESS OF BREATH: 0
GASTROINTESTINAL NEGATIVE: 1
VOICE CHANGE: 1
SORE THROAT: 1
WHEEZING: 0
TROUBLE SWALLOWING: 1
EYES NEGATIVE: 1
COUGH: 0
STRIDOR: 0
FACIAL SWELLING: 0

## 2020-03-02 ASSESSMENT — PAIN SCALES - GENERAL: PAINLEVEL_OUTOF10: 0

## 2020-03-02 NOTE — CARE COORDINATION
CASE MANAGEMENT NOTE:    Admission Date:  3/1/2020 Gaviota Madrigal is a 30 y.o.  female    Admitted for : Pharyngitis, acute [J02.9]    Met with:  Patient    PCP:  Dr Bonnie Talley:  Sherita Peabody:  independently at home             Current Services PTA:  No    Is patient agreeable to VNS: No    Freedom of choice provided:  Yes    List of [address] provided: No    VNS chosen:  No    DME:  none    Home Oxygen: No    Nebulizer: No    CPAP/BIPAP: No    Supplier: N/A    Potential Assistance Needed: No    SNF needed: No    Freedom of choice and list provided: Yes    Pharmacy:  [facility] [address]       Does Patient want to use MEDS to BEDS? No    Is the Patient an [facility] with Readmission Risk Score greater than 14%? No  If yes, pt needs a follow up appointment made within 7 days. Family Members/Caregivers that pt would like involved in their care:    No    Transportation Provider:  Patient             Is patient in Isolation/One on One/Altered Mental Status? No  If yes, skip next question. If no, would they like an I-Pad to  use? No  If yes, call [phone]. Discharge Plan:  3/2/2020  [facility]   DME: none  Pt lives independently at home and drives  Pt declines VNS and expects to return home wo needs.   Consult otolaryngology  IV unasyn and decadron, NPO//rm                 Electronically signed by: Vilma Brittle, RN on 3/2/2020 at 11:51 AM

## 2020-03-02 NOTE — PROGRESS NOTES
[address] [facility].    PROGRESS NOTE             3/2/2020    7:58 AM    Name:   Ashlee Justin  MRN:     [id number]     Acct:      [de-identified]   Room:   2085/2085-01   Day:  1  Admit Date:  3/1/2020 10:56 AM    PCP:  Bhaevsh Downey MD  Code Status:  Full Code    Subjective:     C/C:   Chief Complaint   Patient presents with    Pharyngitis     Interval History Status: improved. Patient seen and examined at bedside. She is resting comfortably in bed. No acute events overnight. Afebrile, VSS. Patient states that she feels she is able to breathe significantly easier. She also states that the pain associated with her tonsillitis has improved significantly as well. She states she would like to leave today. Awaiting ENT consult. Of note, the patient has been leaving the floor multiple times at the day to smoke. Brief History:     Patient is a 73-year-old female who presented with strep pharyngitis with very enlarged tonsils. She was admitted due to concern for possible airway obstruction. She is currently on Unasyn and Decadron. Review of Systems:     Review of Systems   Constitutional: Positive for chills. Negative for diaphoresis and fatigue. HENT: Positive for sore throat, trouble swallowing and voice change (hot potato voice). Negative for congestion, drooling and facial swelling. Eyes: Negative. Respiratory: Negative for cough, shortness of breath, wheezing and stridor. Cardiovascular: Negative for chest pain, palpitations and leg swelling. Gastrointestinal: Negative. Genitourinary: Negative. Musculoskeletal: Negative. Skin: Negative. Psychiatric/Behavioral: Negative. Medications:      Allergies:  Not on File    Current Meds:   Scheduled Meds:    ampicillin-sulbactam  3 g Intravenous Q6H    dexamethasone  10 mg Intravenous Q8H    sodium chloride flush  10 mL Intravenous 2 times per day    enoxaparin  30 mg Subcutaneous BID     Continuous Infusions:   PRN Meds: ketorolac, sodium chloride flush, acetaminophen **OR** acetaminophen, polyethylene glycol, promethazine **OR** ondansetron, potassium chloride **OR** potassium alternative oral replacement **OR** potassium chloride    Data:     Past Medical History:   has a past medical history of Essential hypertension, H/O trichomonal vaginitis, Obesity, and NEDA (obstructive sleep apnea). Social History:   reports that she has been smoking cigarettes. She has been smoking about 0.50 packs per day. She has never used smokeless tobacco. She reports that she does not drink alcohol or use drugs. Family History:   Family History   Problem Relation Age of Onset    Mental Illness Mother     COPD Mother     No Known Problems Father        Vitals:  /73   Pulse 96   Temp 97.6 °F (36.4 °C) (Oral)   Resp 18   Ht 5' 7\" (1.702 m)   Wt 300 lb (136.1 kg)   LMP 2020   SpO2 95%   BMI 46.99 kg/m²   Temp (24hrs), Av.2 °F (36.8 °C), Min:97.2 °F (36.2 °C), Max:101.9 °F (38.8 °C)    No results for input(s): POCGLU in the last 72 hours. I/O(24Hr): Intake/Output Summary (Last 24 hours) at 3/2/2020 0758  Last data filed at 3/2/2020 5519  Gross per 24 hour   Intake 118 ml   Output 200 ml   Net -82 ml       Labs:  [unfilled]    Lab Results   Component Value Date/Time    SPECIAL NOT REPORTED 2020 01:58 PM     Lab Results   Component Value Date/Time    CULTURE NO GROWTH 8 HOURS 2020 11:26 AM       [unfilled]    Radiology:    Ct Soft Tissue Neck W Contrast    Result Date: 3/1/2020  EXAMINATION: CT OF THE NECK SOFT TISSUE WITH CONTRAST  3/1/2020 TECHNIQUE: CT of the neck was performed with the administration of intravenous contrast. Multiplanar reformatted images are provided for review.  Dose modulation, iterative reconstruction, and/or weight based adjustment of the mA/kV was utilized to reduce the radiation dose to as low as reasonably achievable. COMPARISON: None. HISTORY: ORDERING SYSTEM PROVIDED HISTORY: sore throat, swelling. TECHNOLOGIST PROVIDED HISTORY: sore throat, swelling. Reason for Exam: throat swelling, pain and trouble talking for 3 days, has a fever Acuity: Acute Type of Exam: Initial FINDINGS: PHARYNX/LARYNX:  Diffuse soft tissue swelling in the posterior pharynx is noted without asymmetry. No palatine tonsillar mass. The tongue is normal in appearance. The valleculae, epiglottis, aryepiglottic folds and pyriform sinuses appear unremarkable. The true and false vocal cords are normal in appearance. No mass or abscess is seen. SALIVARY GLANDS/THYROID:  The parotid and submandibular glands appear unremarkable. The thyroid gland appears unremarkable. LYMPH NODES: Moderate bilateral anterior cervical lymphadenopathy is noted with lymph nodes averaging 11-16 mm. There are lymph nodes just inferior to the parotid glands, within normal limits. [place] left lower cervical lymph nodes level 6 are noted. SOFT TISSUES:  Diffuse posterior pharyngeal soft tissue swelling. Included lung apices are unremarkable. BRAIN/ORBITS/SINUSES:  The visualized portion of the intracranial contents appear unremarkable. The visualized portion of the orbits, and mastoid air cells demonstrate no acute abnormality. Mild to moderate maxillary and ethmoid sinus inflammation is present. LUNG APICES/SUPERIOR MEDIASTINUM:  No focal consolidation is seen within the visualized lung apices. No superior mediastinal lymphadenopathy or mass. The visualized portion of the trachea appears unremarkable. BONES:  No aggressive appearing lytic or blastic bony lesion. Diffuse posterior pharyngeal soft tissue swelling. Bilateral moderate cervical lymphadenopathy. [place] tonsils are enlarged without abscess. Findings compatible with pharyngitis and tonsillitis.          Physical Examination:        Physical Exam  Constitutional:       General: She is not in acute

## 2020-03-02 NOTE — PLAN OF CARE
Problem: Infection:  Goal: Will remain free from infection  Description  Will remain free from infection  Outcome: Ongoing     Problem: PAIN  Goal: Patient's pain/discomfort is manageable  Outcome: Ongoing   Pt pain controlled at this time, currently on ATB tx.

## 2020-03-02 NOTE — DISCHARGE INSTR - COC
Continuity of Care Form    Patient Name: Claretta Duane   :  1990  MRN:  [id number]    Admit date:  3/1/2020  Discharge date:  ***    Code Status Order: Full Code   Advance Directives:   Advance Care Flowsheet Documentation     Date/Time Healthcare Directive Type of Healthcare Directive Copy in [address] Agent's Name Healthcare Agent's Phone Number    20  No, patient does not have an advance directive for healthcare treatment -- -- -- -- --          Admitting Physician:  Gary Winter MD  PCP: Adeline Hook MD    Discharging Nurse: [facility] Unit/Room#: 2085/2085-01  Discharging Unit Phone Number: ***    Emergency Contact:   Extended Emergency Contact Information  Primary Emergency Contact: [place] [address] Phone: [phone]  Relation: Parent    Past Surgical History:  Past Surgical History:   Procedure Laterality Date    MOUTH SURGERY         Immunization History: There is no immunization history on file for this patient.     Active Problems:  Patient Active Problem List   Diagnosis Code    NEDA (obstructive sleep apnea) G47.33    Essential hypertension I10    Morbid obesity with BMI of 50.0-59.9, adult ([facility]) E66.01, Z68.43    Polycystic ovarian syndrome E28.2    Pharyngitis, acute J02.9    Acute streptococcal pharyngitis J02.0       Isolation/Infection:   Isolation          No Isolation        Patient Infection Status     None to display          Nurse Assessment:  Last Vital Signs: /81   Pulse 91   Temp 97.8 °F (36.6 °C) (Oral)   Resp 18   Ht 5' 7\" (1.702 m)   Wt (!) 302 lb 7.5 oz (137.2 kg)   LMP 2020   SpO2 95%   BMI 47.37 kg/m²     Last documented pain score (0-10 scale): Pain Level: 0  Last Weight:   Wt Readings from Last 1 Encounters:   20 (!) 302 lb 7.5 oz (137.2 kg)     Mental Status:  {IP PT MENTAL STATUS:}    IV Access:  { PABLO IV ACCESS:[id number]}    Nursing

## 2020-03-02 NOTE — CONSULTS
Department   of   Otolaryngology    Assessment:   Acute Streptococcal Tonsillitis   Morbid obesity   Tonsil hypertrophy    Plan:  -she is doing well today and feels remarkably better overnight and wants to eat and go home  -no need for surgical intervention  -OK to start diet  -OK to d/c home from [facility] perspective on po Augmentin 875 bid for 10 days  -no need for outpatient tonsillectomy since this is her first episode of tonsillitis in the last decade      CHIEF   COMPLAINT:      Sore throat    CONSULTING PHYSICIAN:  Dr. Batsheva Dave:                            Chapo Allison      is   a   30 y.o.   female   with   significant   past   medical   history   of   NEDA and obesity  who presented with Pharyngitis  and she is admitted to the hospital for the management of acute streptococcal pharyngitis onset 3/1 with chief complaint of sore throat for the past 3 days. Patient says it is painful to swallow, her voice sounds muffled. She has not tried eating any food due to the pain. Patient stating she has some subjective fevers and chills but otherwise denies abdominal pain, nausea, vomiting, chest pain, shortness of breath. Her last episode of tonsillitis was over a decade ago.      In the ED strep swab was positive for Streptococcus A. CT showing Diffuse posterior pharyngeal soft tissue swelling. Bilateral moderate cervical lymphadenopathy.  [place] tonsils are enlarged without abscess. Findings compatible with pharyngitis and tonsillitis. There was leukocytosis of 25.8. Today she is feeling much better overall and her pain and swelling is improved.     Past   Medical   History:       Diagnosis Date    Essential hypertension 8/22/2019    H/O trichomonal vaginitis     Obesity     NEDA (obstructive sleep apnea) 1/25/2018          Surgical   History:       Procedure Laterality Date    MOUTH SURGERY            Medications: Current Facility-Administered Medications: ampicillin-sulbactam (UNASYN) 3 g ivpb minibag, 3 g, Intravenous, Q6H  dexamethasone (PF) (DECADRON) injection 10 mg, 10 mg, Intravenous, Q8H  ketorolac (TORADOL) injection 30 mg, 30 mg, Intravenous, Q6H PRN  sodium chloride flush 0.9 % injection 10 mL, 10 mL, Intravenous, 2 times per day  sodium chloride flush 0.9 % injection 10 mL, 10 mL, Intravenous, PRN  acetaminophen (TYLENOL) tablet 650 mg, 650 mg, Oral, Q6H PRN **OR** acetaminophen (TYLENOL) suppository 650 mg, 650 mg, Rectal, Q6H PRN  polyethylene glycol (GLYCOLAX) packet 17 g, 17 g, Oral, Daily PRN  promethazine (PHENERGAN) tablet 12.5 mg, 12.5 mg, Oral, Q6H PRN **OR** ondansetron (ZOFRAN) injection 4 mg, 4 mg, Intravenous, Q6H PRN  enoxaparin (LOVENOX) injection 30 mg, 30 mg, Subcutaneous, BID  potassium chloride (KLOR-CON M) extended release tablet 40 mEq, 40 mEq, Oral, PRN **OR** potassium bicarb-citric acid (EFFER-K) effervescent tablet 40 mEq, 40 mEq, Oral, PRN **OR** potassium chloride 10 mEq/100 mL IVPB (Peripheral Line), 10 mEq, Intravenous, PRN     Allergies:      Patient has no allergy information on record.     Social History:    Social History     Socioeconomic History    Marital status: Single     Spouse name: Not on file    Number of children: Not on file    Years of education: Not on file    Highest education level: Not on file   Occupational History    Not on file   Social Needs    Financial resource strain: Not on file    Food insecurity:     Worry: Not on file     Inability: Not on file    Transportation needs:     Medical: Not on file     Non-medical: Not on file   Tobacco Use    Smoking status: Current Every Day Smoker     Packs/day: 0.50     Types: Cigarettes    Smokeless tobacco: Never Used   Substance and Sexual Activity    Alcohol use: No    Drug use: No    Sexual activity: Yes     Partners: Male   Lifestyle    Physical activity:     Days per week: Not on file     Minutes per session: Not on file    Stress: Not on file  [place] tonsils are enlarged without abscess. Findings compatible with pharyngitis and tonsillitis. EXAM:   VITALS:      /81   Pulse 91   Temp 97.8 °F (36.6 °C) (Oral)   Resp 18   Ht 5' 7\" (1.702 m)   Wt (!) 302 lb 7.5 oz (137.2 kg)   LMP 02/16/2020   SpO2 95%   BMI 47.37 kg/m²     CONSTITUTIONAL:      awake,   alert,   cooperative,   no   apparent   distress,   and   appears   stated   Age, morbidly obese  No labored breathing, no stridor, normal voice  EYES:      Lids   and   lashes   normal,   pupils   equal,   round   and   reactive   to   light,   extra   ocular   muscles intact,   sclera   clear,   conjunctiva   Normal  HEAD:      Normocephalic,   without   obvious   abnormality,   atraumatic,   sinuses   nontender   on   palpation,   EARS:external   ears   without   lesions,   Minimal wax noted  NOSE: patent nasal airway, no bleeding noted, no lesions  ORAL: oral cavity &  pharynx   with   moist   mucus   membranes,   tonsils 3-4+  with erythema but no   exudates,   no fluctuance, uvula midline, gums   normal   and   good   Dentition. The floor of mouth is soft without induration, the tongue base is soft as well. NECK:      Supple,   symmetrical,   trachea   midline, minimal cervical  adenopathy,   thyroid   symmetric,   not   enlarged and   no   tenderness,   skin   Normal  CHEST: equal expansion and symmetric, lungs CTA  CV: Heart RRR  MUSCULOSKELETAL:      There   is   no   redness,   warmth,   or   swelling   of   the   joints. Full   range   of motion   noted. Motor   strength   is   5   out   of   5   all   extremities   bilaterally. Tone   is   normal.   NEUROLOGIC:      Awake,   alert,   oriented   to   name,   place   and   time. Cranial   nerves   II-XII   are grossly   intact. Motor   is   5   out   of   5   bilaterally. Sensory   is   Intact.   PSYCH: Normal Mood and affect  SKIN: Normal turgor, no rash      Electronically   signed   by Adenike Diamond MD   on   3/2/2020   at   3:17 PM

## 2020-03-02 NOTE — FLOWSHEET NOTE
03/02/20 1618   Encounter Summary   Services provided to: Patient and family together   Referral/Consult From: Rounding   Continue Visiting   (3-2-20)   Complexity of Encounter Low   Length of Encounter 15 minutes   Spiritual Assessment Completed Yes   Routine   Type Initial   Assessment Calm; Approachable   Intervention Active listening;Explored feelings, thoughts, concerns;[place];Sustaining presence/ Ministry of presence   Outcome Expressed gratitude;Engaged in conversation

## 2020-03-02 NOTE — PROGRESS NOTES
Pt discharged to home, via ambulatory, with mom  Pt. Has no c/o of pain, or SOB at this time    Resp. Easy  @  18/min  Pt. Verbalized understanding of all discharge instructions and follow up appt's.

## 2020-03-02 NOTE — PROGRESS NOTES
Pt has no pulmonary history. Pt does not wear CPAP at home and states will not wear one here. V60  In room on standby. No respiratory meds need at this time. BS: clear, RR 18, SpO2 95% room air.

## 2020-03-02 NOTE — PROGRESS NOTES
ADMISSION NOTE       Patient admitted to room  2085. Time of admit:  [address] from:  er    Reason for admission:  pharyngitis    Where patient has been residing for the last 24 hrs:  privat residence    Has the patient been admitted to any facility in the last 4 weeks, which one:  na    Family at bedside:  yes    Patient is currently in pain no  Patient has been oriented to room, educated on how to use call light, and to call for assistance prior to getting up. Bed in lowest and locked position. 2 siderails up for safety. Call light within reach.

## 2020-03-03 ENCOUNTER — TELEPHONE (OUTPATIENT)
Dept: FAMILY MEDICINE CLINIC | Age: 30
End: 2020-03-03

## 2020-03-03 LAB
CULTURE: NO GROWTH
Lab: NORMAL
SPECIMEN DESCRIPTION: NORMAL

## 2020-03-03 NOTE — TELEPHONE ENCOUNTER
[facility] Transitions Initial Follow Up Call    Outreach made within 2 business days of discharge: Yes    Patient: Rajat Schmitt Patient : 1990   MRN: [id number]  Reason for Admission: There are no discharge diagnoses documented for the most recent discharge. Discharge Date: 3/2/20       Spoke with: Radha    Discharge department/facility: [place] [address] Interactive Patient Contact:  Was patient able to fill all prescriptions: yes  Was patient instructed to bring all medications to the follow-up visit: yes  Is patient taking all medications as directed in the discharge summary? yes  Does patient understand their discharge instructions: yes  Does patient have questions or concerns that need addressed prior to 7-14 day follow up office visit: no    Scheduled appointment with PCP within 7-14 days    Follow Up  No future appointments.     Cem Chavarria

## 2020-03-03 NOTE — DISCHARGE SUMMARY
[address]    Discharge Summary     Patient ID: Renetta Mccauley  :  1990   MRN: [id number]     ACCOUNT:  [de-identified]   Patient's PCP: Luiza Fraser MD  Admit Date: 3/1/2020   Discharge Date: 3/2/2020  Length of Stay: 1  Code Status:  Prior  Admitting Physician: Swapnil Elizondo MD  Discharge Physician: Rajni Arechiga MD     Active Discharge Diagnoses:       Primary Problem  Acute streptococcal pharyngitis      Hospital Problems  Active Hospital Problems    Diagnosis Date Noted    Pharyngitis, acute [J02.9] 2020    Acute streptococcal pharyngitis [J02.0] 2020    Morbid obesity with BMI of 50.0-59.9, adult ([facility] [address] [E66.01, Z68.43] 2019    Essential hypertension [I10] 2019    NEDA (obstructive sleep apnea) [G47.33] 2018       Admission Condition:  poor     Discharged Condition: good    Hospital Stay:       Hospital Course:  Renetta Mccauley is a 30 y.o. female who was admitted for the management of  Acute streptococcal pharyngitis , presented to ER with Pharyngitis    This is a 31 y/o female who presented to the ED with chief complaint of sore throat and pain with swallowing who was treated for acute streptococcal pharyngitis. The patient was admitted to do diffuse posterior pharyngeal soft tissue swelling and concern for potential for airway obstruction. The patient was started on Unasyn and Decadron. She has significant improvement in her symptoms. ENT was consulted for the patient and had no further recommendations.      Significant therapeutic interventions: IV antibiotics, IV steroids    Significant Diagnostic Studies:   Labs / Micro:  CBC:   Lab Results   Component Value Date    WBC 25.9 2020    RBC 4.18 2020    HGB 12.3 2020    HCT 37.6 2020    MCV 89.9 2020    MCH 29.5 2020    MCHC 32.9 2020    RDW 14.0 2020     2020     BMP:    Lab Results   Component Value Date    GLUCOSE 152 03/02/2020     03/02/2020    K 3.9 03/02/2020     03/02/2020    CO2 23 03/02/2020    ANIONGAP 13 03/02/2020    BUN 15 03/02/2020    CREATININE 0.47 03/02/2020    BUNCRER NOT REPORTED 03/02/2020    CALCIUM 9.2 03/02/2020    LABGLOM >60 03/02/2020    GFRAA >60 03/02/2020    GFR      03/02/2020    GFR NOT REPORTED 03/02/2020       Radiology:  Ct Soft Tissue Neck W Contrast    Result Date: 3/1/2020  EXAMINATION: CT OF THE NECK SOFT TISSUE WITH CONTRAST  3/1/2020 TECHNIQUE: CT of the neck was performed with the administration of intravenous contrast. Multiplanar reformatted images are provided for review. Dose modulation, iterative reconstruction, and/or weight based adjustment of the mA/kV was utilized to reduce the radiation dose to as low as reasonably achievable. COMPARISON: None. HISTORY: ORDERING SYSTEM PROVIDED HISTORY: sore throat, swelling. TECHNOLOGIST PROVIDED HISTORY: sore throat, swelling. Reason for Exam: throat swelling, pain and trouble talking for 3 days, has a fever Acuity: Acute Type of Exam: Initial FINDINGS: PHARYNX/LARYNX:  Diffuse soft tissue swelling in the posterior pharynx is noted without asymmetry. No palatine tonsillar mass. The tongue is normal in appearance. The valleculae, epiglottis, aryepiglottic folds and pyriform sinuses appear unremarkable. The true and false vocal cords are normal in appearance. No mass or abscess is seen. SALIVARY GLANDS/THYROID:  The parotid and submandibular glands appear unremarkable. The thyroid gland appears unremarkable. LYMPH NODES: Moderate bilateral anterior cervical lymphadenopathy is noted with lymph nodes averaging 11-16 mm. There are lymph nodes just inferior to the parotid glands, within normal limits. Shotty left lower cervical lymph nodes level 6 are noted. SOFT TISSUES:  Diffuse posterior pharyngeal soft tissue swelling. Included lung apices are unremarkable.  BRAIN/ORBITS/SINUSES: tablets by mouth 2 times daily for 5 days, THEN 1 tablet 2 times daily for 5 days, THEN 0.5 tablets 2 times daily for 5 days. Start taking on:  March 2, 2020        CONTINUE taking these medications    enalapril 2.5 MG tablet  Commonly known as:  Vasotec  Take 1 tablet by mouth daily     ibuprofen 800 MG tablet  Commonly known as:  ADVIL;MOTRIN  Take 1 tablet by mouth every 6 hours as needed for Pain     metFORMIN 1000 MG tablet  Commonly known as:  GLUCOPHAGE  Take 1 tablet by mouth 2 times daily (with meals)           Where to Get Your Medications      These medications were sent to [facility] [id number], [address] [place] [address] Rd.    Phone:  [phone]   · amoxicillin-clavulanate 875-125 MG per tablet  · predniSONE 20 MG tablet         Time Spent on discharge is  35 mins in patient examination, evaluation, counseling as well as medication reconciliation, prescriptions for required medications, discharge plan and follow up. Electronically signed by   Di Vernon MD  3/3/2020  3:23 PM      Thank you Dr. She Fuentes MD for the opportunity to be involved in this patient's care.

## 2020-03-05 ENCOUNTER — OFFICE VISIT (OUTPATIENT)
Dept: FAMILY MEDICINE CLINIC | Age: 30
End: 2020-03-05
Payer: COMMERCIAL

## 2020-03-05 VITALS
WEIGHT: 293 LBS | BODY MASS INDEX: 45.99 KG/M2 | HEIGHT: 67 IN | SYSTOLIC BLOOD PRESSURE: 126 MMHG | OXYGEN SATURATION: 98 % | DIASTOLIC BLOOD PRESSURE: 80 MMHG | HEART RATE: 91 BPM

## 2020-03-05 PROCEDURE — 99215 OFFICE O/P EST HI 40 MIN: CPT | Performed by: FAMILY MEDICINE

## 2020-03-05 PROCEDURE — 1111F DSCHRG MED/CURRENT MED MERGE: CPT | Performed by: FAMILY MEDICINE

## 2020-03-05 ASSESSMENT — ENCOUNTER SYMPTOMS
NAUSEA: 0
SORE THROAT: 0
ABDOMINAL PAIN: 0
SHORTNESS OF BREATH: 0

## 2020-03-05 ASSESSMENT — PATIENT HEALTH QUESTIONNAIRE - PHQ9
SUM OF ALL RESPONSES TO PHQ QUESTIONS 1-9: 0
1. LITTLE INTEREST OR PLEASURE IN DOING THINGS: 0
2. FEELING DOWN, DEPRESSED OR HOPELESS: 0
SUM OF ALL RESPONSES TO PHQ9 QUESTIONS 1 & 2: 0
SUM OF ALL RESPONSES TO PHQ QUESTIONS 1-9: 0

## 2020-03-07 LAB
CULTURE: NORMAL
CULTURE: NORMAL
Lab: NORMAL
Lab: NORMAL
SPECIMEN DESCRIPTION: NORMAL
SPECIMEN DESCRIPTION: NORMAL

## 2020-03-09 RX ORDER — FLUCONAZOLE 100 MG/1
100 TABLET ORAL DAILY
Qty: 7 TABLET | Refills: 0 | Status: SHIPPED | OUTPATIENT
Start: 2020-03-09 | End: 2020-08-20

## 2020-04-03 RX ORDER — ENALAPRIL MALEATE 2.5 MG/1
2.5 TABLET ORAL DAILY
Qty: 30 TABLET | Refills: 1 | Status: SHIPPED | OUTPATIENT
Start: 2020-04-03 | End: 2021-01-20

## 2020-08-20 ENCOUNTER — TELEPHONE (OUTPATIENT)
Dept: DERMATOLOGY | Age: 30
End: 2020-08-20

## 2020-08-20 ENCOUNTER — OFFICE VISIT (OUTPATIENT)
Dept: DERMATOLOGY | Age: 30
End: 2020-08-20
Payer: COMMERCIAL

## 2020-08-20 VITALS
TEMPERATURE: 97.3 F | HEART RATE: 121 BPM | BODY MASS INDEX: 45.99 KG/M2 | SYSTOLIC BLOOD PRESSURE: 159 MMHG | WEIGHT: 293 LBS | OXYGEN SATURATION: 95 % | DIASTOLIC BLOOD PRESSURE: 101 MMHG | HEIGHT: 67 IN

## 2020-08-20 PROCEDURE — G8417 CALC BMI ABV UP PARAM F/U: HCPCS | Performed by: DERMATOLOGY

## 2020-08-20 PROCEDURE — 99203 OFFICE O/P NEW LOW 30 MIN: CPT | Performed by: DERMATOLOGY

## 2020-08-20 PROCEDURE — 4004F PT TOBACCO SCREEN RCVD TLK: CPT | Performed by: DERMATOLOGY

## 2020-08-20 PROCEDURE — G8427 DOCREV CUR MEDS BY ELIG CLIN: HCPCS | Performed by: DERMATOLOGY

## 2020-08-20 RX ORDER — CLOBETASOL PROPIONATE 0.5 MG/G
CREAM TOPICAL
Qty: 120 G | Refills: 3 | Status: SHIPPED | OUTPATIENT
Start: 2020-08-20 | End: 2021-04-14 | Stop reason: SDUPTHER

## 2020-08-20 NOTE — TELEPHONE ENCOUNTER
Pt called regarding her clobetasol script. Advised her Dr. [place] was still with patients and it would be sent by end of day. Please send to pharmacy.

## 2020-08-20 NOTE — PROGRESS NOTES
Dermatology Patient Note  [facility]  [address]  [address] [address]  Dept: [phone]  Dept Fax: [phone]      VISIT DATE: 8/20/2020   REFERRING PROVIDER: No ref. provider found      Nely Caban is a 30 y.o. female  who presents today in the office for:    New Patient (Rash on buttocks, arms, elbows, legs and scalp since 3/31. Dad and sister have psoriasis. Itches when its dry. Currently using eczema lotion-nothing prescribed)      HISTORY OF PRESENT ILLNESS:  HPI Rash:    Pavithra Nunez was seen today for initial evaluation of Rash    Duration of Rash: 5 months    Course: Worsening    Areas of Involvement: Generalized    Associated Symptoms: Irritation    Exacerbating Factors: strep infection     Current Medications for this Rash:  OTC lotion     Previously Tried Medications: None    Problem Specific Family Hx: Psoriasis      CURRENT MEDICATIONS:   Current Outpatient Medications   Medication Sig Dispense Refill    enalapril (VASOTEC) 2.5 MG tablet TAKE 1 TABLET BY MOUTH DAILY 30 tablet 1     No current facility-administered medications for this visit. ALLERGIES:   No Known Allergies    SOCIAL HISTORY:  Social History     Tobacco Use    Smoking status: Current Every Day Smoker     Packs/day: 0.50     Types: Cigarettes    Smokeless tobacco: Never Used   Substance Use Topics    Alcohol use: No       REVIEW OF SYSTEMS:  Review of Systems   Constitutional: Negative.       Skin:Denies any new changing, growing or bleeding lesions or rashes except as described in the HPI     PHYSICAL EXAM:   BP (!) 159/101   Pulse 121   Temp 97.3 °F (36.3 °C)   Ht 5' 7\" (1.702 m)   Wt (!) 361 lb 6.4 oz (163.9 kg)   LMP 08/13/2020   SpO2 95%   BMI 56.60 kg/m²     General Exam:  General Appearance: No acute distress, Well nourished     Neuro: Alert and oriented to person, place and time  Psych: Normal affect   Lymph Node: Not performed    Cutaneous Exam: Performed as documented in clinic note below. Total skin excluding undergarment areas, which includes the head/face, neck, both arms, chest, back, abdomen, both legs, digits and/or nails, was examined. Pertinent Physical Exam Findings:  Physical Exam  Skin:     Comments: Psoriasis arms, legs, trunk 50% BSA         Medical Necessity of Exam Performed:   Widespread Rash    Additional Diagnostic Testing performed during exam: Not performed ,  Not performed    ASSESSMENT:   Diagnosis Orders   1. Psoriasis  ALT    AST    BUN    CBC Auto Differential    Creatinine, Serum    HCG Qualitative, Serum    Hepatitis Panel, Acute    HIV Screen    Quantiferon TB Gold       Plan of Action is as Follows:  Assessment 1. Psoriasis  - Severe 50% BSA  - [facility] has no available providers who accept patient insurance  - MTX contraindicated as patient is sexually active without protection and of child bearing age  - Patient denies personal or FH of CHF, MS or IBD  - Discussed Humira as a good choice as it provides good clearance and is compatible with pregnancy if patient gets pregnant  - Clobetasol BID  - Pending PA approval start Humira in office  - Patient counseled on potential adverse effects of biologic medications, including, but not limited to, increased risk of infections (notably reactivation of tuberculosis), transaminitis, demyelinating disease, lupus-like syndrome, and possible increased risk of malignancy. - ALT; Future  - AST; Future  - BUN; Future  - CBC Auto Differential; Future  - Creatinine, Serum; Future  - HCG Qualitative, Serum; Future  - Hepatitis Panel, Acute; Future  - HIV Screen; Future  - Quantiferon TB Gold; Future          Patient Instructions   - Labs today  - Clobetasol twice daily to active psoriasis  - We will submit a prior authorization for Humira and contact you once approved      Psoriasis    What is psoriasis? Psoriasis (sore-EYE-ah-sis) is a chronic (long-lasting) disease.  It develops when a persons immune system sends faulty signals that tell skin cells to grow too quickly. New skin cells form in days rather than weeks. The body does not shed these excess skin cells. The skin cells pile up on the surface of the skin, causing patches of psoriasis to appear. Psoriasis may look contagious, but it's not. You cannot get psoriasis from touching someone who has it. To get psoriasis, a person must inherit the genes that cause it. Types of psoriasis If you have psoriasis, you will have one or more of these types: Plaque (also called psoriasis vulgaris). Guttate. Inverse (also called flexural psoriasis or intertriginous psoriasis). Pustular. Erythrodermic (also called exfoliative psoriasis). Some people get more than one type. Sometimes a person gets one type of psoriasis, and then the type of psoriasis changes. Signs and symptoms:  What you see and feel depends on the type of psoriasis you have. You may have just a few of the signs and symptoms listed below, or you may have many. Plaque psoriasis  (also called psoriasis vulgaris)  Raised, reddish patches on the skin called plaque (plak). Patches may be covered with a silvery-white coating, which dermatologists call scale. Patches can appear anywhere on the skin. Most patches appear on the knees, elbows, lower back, and scalp. Patches can itch. Scratching the itchy patches often causes the patches to thicken. Patches vary in size and can appear as separate patches or join together to cover a large area. Nail problems -- pits in the nails, crumbling nail, nail falls off. What you see and feel depends on the type of psoriasis you have. You may have just a few of the signs and symptoms listed below, or you may have many. Plaque psoriasis (also called psoriasis vulgaris) Raised, reddish patches on the skin called plaque (plak). Patches may be covered with a silvery-white coating, which dermatologists call scale. Patches can appear anywhere on the skin.  Most patches appear on the knees, elbows, lower back, and scalp. Patches can itch. Scratching the itchy patches often causes the patches to thicken. Patches vary in size and can appear as separate patches or join together to cover a large area. Nail problems -- pits in the nails, crumbling nail, nail falls off. Guttate psoriasis  Small, red spots (usually on the trunk, arms, and legs but can appear on the scalp, face, and ears). Spots can show up all over the skin. Spots often appear after an illness, especially strep throat. Spots may clear up in a few weeks or months without treatment. Spots may appear where the person had plaque psoriasis. Pustular psoriasis  Skin red, swollen, and dotted with pus-filled bumps. Bumps usually appear only on the palms and soles. Soreness and pain where the bumps appear. Pus-filled bumps will dry, and leave behind brown dots and/or scale on the skin. When pus-filled bumps cover the body, the person also may have:  Guttate psoriasis Small, red spots (usually on the trunk, arms, and legs but can appear on the scalp, face, and ears). Spots can show up all over the skin. Spots often appear after an illness, especially strep throat. Spots may clear up in a few weeks or months without treatment. Spots may appear where the person had plaque psoriasis. Pustular psoriasis Skin red, swollen, and dotted with pus-filled bumps. Bumps usually appear only on the palms and soles. Soreness and pain where the bumps appear. Pus-filled bumps will dry, and leave behind brown dots and/or scale on the skin. When pus-filled bumps cover the body, the person also may have:  Bright-red skin. Been feeling sick and exhausted. Fever. Chills. Severe itching. Rapid pulse. Loss of appetite. Muscle weakness.     Inverse psoriasis   (also called flexural psoriasis or intertriginous psoriasis)Inverse psoriasis (also called flexural psoriasis or intertriginous psoriasis)  Smooth, red patches of skin that look raw. Patches only develop where skin touches skin, such as the armpits, around the groin, genitals, and buttocks. Women can develop a red, raw patch under their breasts. Skin feels very sore where inverse psoriasis appears. Erythrodermic psoriasis  (also called exfoliative psoriasis)  Skin looks like it is burned. Most (or all) of the skin on the body turns bright red. Body cannot maintain its normal temperature of 98.6° F. Person gets very hot or very cold. Heart beats too fast.   Intense itching. Intense pain. Smooth, red patches of skin that look raw. Patches only develop where skin touches skin, such as the armpits, around the groin, genitals, and buttocks. Women can develop a red, raw patch under their breasts. Skin feels very sore where inverse psoriasis appears. Erythrodermic psoriasis (also called exfoliative psoriasis) Skin looks like it is burned. Most (or all) of the skin on the body turns bright red. Body cannot maintain its normal temperature of 98.6° F. Person gets very hot or very cold. Heart beats too fast. Intense itching. Intense pain. If it looks like a person has erythrodermic psoriasis, get the person to a hospital right away. The persons life may be in danger. Who gets psoriasis? People who get psoriasis usually have one or more person in their family who has psoriasis. Not everyone who has a family member with psoriasis will get psoriasis. But psoriasis is common. In the [place], about 7.5 million people have psoriasis. Most people, about 80%, have plaque psoriasis. Psoriasis can begin at any age. Most people get psoriasis between 13and 27years of age. By age 36, most people who will get psoriasis, about 75%, have psoriasis. Another common time for psoriasis to begin is between 48and 61years of age. Whites get psoriasis more often than other races. Infants and young children are more likely to get inverse psoriasis and guttate psoriasis.  What causes psoriasis? Scientists are still trying to learn everything that happens inside the body to cause psoriasis. We know that psoriasis is not contagious. You cannot get psoriasis from swimming in the same pool or having sex. Scientists have learned that a persons immune system and genes play important roles. It seems that many genes must interact to cause psoriasis. Scientists also know that not everyone who inherits the genes for psoriasis will get psoriasis. It seems that a person must inherit the right mix of genes. Then the person must be exposed to a trigger. Many people say that their psoriasis began after they experienced one of these common psoriasis triggers: A stressful event. Strep throat. Taking certain medicines, such as lithium, or medicine to prevent malaria. Cold, dry weather. A cut, scratch, or bad sunburn. How does a dermatologist diagnose psoriasis? To diagnose psoriasis, a dermatologist:  Examines a patients skin, nails, and scalp for signs of psoriasis. Asks whether family members have psoriasis. Learns about what has been happening in the patients life. A dermatologist may want to know whether a patient has been under a lot of stress, had a recent illness, or just started taking a medicine. Sometimes a dermatologist also removes a bit of skin. A dermatologist may call this confirming the diagnosis. By looking at the removed skin under a microscope, one can confirm whether a person has psoriasis. How do dermatologists treat psoriasis? Treating psoriasis has benefits. Treatment can reduce signs and symptoms of psoriasis, which usually makes a person feel better. With treatment, some people see their skin completely clear. Treatment can even improve a person's quality of life. Thanks to ongoing research, there are many treatments for psoriasis. It is important to work with a dermatologist to find treatment that works for you and fits your lifestyle.  Every treatment has benefits, drawbacks, and possible side effects. Outcome  Psoriasis is a chronic (long-lasting) disease of the immune system. It cannot be cured. This means that most people have psoriasis for life. By teaming up with a dermatologist who treats psoriasis, you can find a treatment plan that works for you. Dermatologists encourage their patients who have psoriasis to take an active role in managing this disease. By taking an active role, you can reduce the effects that psoriasis has on your quality of life. How does a dermatologist diagnose psoriasis? To diagnose psoriasis, a dermatologist: Examines a patients skin, nails, and scalp for signs of psoriasis. Asks whether family members have psoriasis. Learns about what has been happening in the patients life. A dermatologist may want to know whether a patient has been under a lot of stress, had a recent illness, or just started taking a medicine. Sometimes a dermatologist also removes a bit of skin. A dermatologist may call this confirming the diagnosis. By looking at the removed skin under a microscope, one can confirm whether a person has psoriasis. How do dermatologists treat psoriasis? Treating psoriasis has benefits. Treatment can reduce signs and symptoms of psoriasis, which usually makes a person feel better. With treatment, some people see their skin completely clear. Treatment can even improve a person's quality of life. Thanks to ongoing research, there are many treatments for psoriasis. It is important to work with a dermatologist to find treatment that works for you and fits your lifestyle. Every treatment has benefits, drawbacks, and possible side effects. Outcome Psoriasis is a chronic (long-lasting) disease of the immune system. It cannot be cured. This means that most people have psoriasis for life. By teaming up with a dermatologist who treats psoriasis, you can find a treatment plan that works for you.  Dermatologists encourage their patients who have psoriasis to take an active role in managing this disease. By taking an active role, you can reduce the effects that psoriasis has on your quality of life. Psoriasis: Tips for managing  Psoriasis is a long-lasting disease. Here are some things you can do that will help you take control. Learn about psoriasis. Knowledge really is power. Learning about psoriasis will help you manage the disease, make informed decisions about how you treat psoriasis, and avoid things that can make psoriasis worse. It will also help you talk about psoriasis with others. Take good care of yourself. Eating a healthy diet, exercising, not smoking, and drinking very little alcohol will help. Smoking, drinking, and being overweight make psoriasis worse. These also can make treatment less effective. People who have psoriasis also have an increased risk for developing heart disease, diabetes, and other diseases, so taking good care of yourself is essential.  Be aware of your joints. If your joints feel stiff and sore, especially when you wake up, see a dermatologist. Stiff or sore joints can be the first sign of psoriatic (sore-EE-at-ic) arthritis. About 10% to 30% of people who have psoriasis get this type of arthritis. Treatment is essential. This type of arthritis can eat away the joints. Treatment can prevent deformed joints and disability. Notice your nails. If your nails begin to pull away from the nail bed or develop pitting, ridges, or a yellowish-orange color, see a dermatologist. These are signs of psoriatic arthritis. Pay attention to your mood. If you feel depressed, you may want to join a psoriasis support group or see a mental health professional. Depression, anxiety, and suicidal behavior are more common in people who have psoriasis. Getting help is not a sign of weakness. Learn about treatment for psoriasis. Some people choose not to treat psoriasis, but it is important to know your options.  This will help you make an informed decision and feel in control. Tell your dermatologist if you cannot afford the medicine. You may be eligible for financial assistance. To learn more about this assistance, visit Financial Assistance Available for Psoriasis Medication. Talk with your dermatologist before you stop taking medicine for psoriasis. Immediately stopping a medicine for psoriasis can have serious consequences. It can cause one type of psoriasis to turn into another, more serious type of psoriasis. Lets say a person who has plaque psoriasis takes a medicine called methotrexate. If the person just stops taking methotrexate, this can cause the plaque psoriasis to turn into guttate psoriasis or erythrodermic psoriasis. This can be very serious. Tips for managing Psoriasis:It is a long-lasting disease. Here are some things you can do that will help you take control. Learn about psoriasis. Knowledge really is power. Learning about psoriasis will help you manage the disease, make informed decisions about how you treat psoriasis, and avoid things that can make psoriasis worse. It will also help you talk about psoriasis with others. Take good care of yourself. Eating a healthy diet, exercising, not smoking, and drinking very little alcohol will help. Smoking, drinking, and being overweight make psoriasis worse. These also can make treatment less effective. People who have psoriasis also have an increased risk for developing heart disease, diabetes, and other diseases, so taking good care of yourself is essential. Be aware of your joints. If your joints feel stiff and sore, especially when you wake up, see a dermatologist. Stiff or sore joints can be the first sign of psoriatic (sore-EE-at-ic) arthritis. About 10% to 30% of people who have psoriasis get this type of arthritis. Treatment is essential. This type of arthritis can eat away the joints. Treatment can prevent deformed joints and disability. Notice your nails.  If your nails classed as a biological drug. It reduces inflammation by inhibiting the activity of a chemical cytokine in the body called tumour necrosis factor alpha (TNF-alpha). Which conditions are treated with adalimumab? Adalimumab is used to treat psoriasis, psoriatic arthritis, and several other inflammatory conditions such as rheumatoid arthritis and Crohns disease. Why have I been selected for treatment with adalimumab? The use of adalimumab is reserved for patients with severe psoriasis who either have not responded to standard treatments, have not been able to tolerate standard treatments or who have a contraindication to standard treatments such as methotrexate. It may also be used for patients with hidradenitis suppurativa who have not responded to or tolerated standard treatments. How long will I need to take adalimumab before it has an effect? Adalimumab does not work immediately and it may be 3-12 weeks before you notice any benefit. In clinical trials, about 68% of patients achieved a good response (as indicated by 75% improvement in the score of their disease severity). How do I take adalimumab? Adalimumab is presented as a pre-filled syringe or in a pre-filled pen device. The pen device automatically injects the drug under the skin. A nurse or doctor will demonstrate how to use the pen; details are also given in the package insert. It must be stored in a refrigerator (at 2-8°C). Travelling with adalimumab, or transporting your treatment, requires a cool box or cool bag with icepacks to maintain these temperatures. Injections are made under the skin of the stomach, thighs or upper outer arms. You will be provided with sharps bins so that you can dispose of your syringes and needles safely. What dose should I take?  The recommended dosage schedule of adalimumab for an adult patient with psoriasis is an initial loading dose of 80 mg, followed by 40 mg every other week for four months starting one week after the initial dose. For an adult patient with hidradenitis suppurativa, the initial loading dose is 160 mg, followed by 80 mg two weeks later, followed by 40 mg weekly beginning two weeks after that. If the response at four months is deemed adequate by your dermatologist, treatment would then normally be continued to maintain improvement. If treatment is stopped, your psoriasis will probably recur but this is unlikely to happen in the first month. What are the possible side effects of adalimumab? Mild  Reactions at the injection sites. These are usually mild and include redness, a rash, swelling, itching, or bruising. They usually go away within 3 to 5 days. If you have pain, redness or swelling around the injection site that doesnt go away, or gets worse, contact your dermatologist.  Upper respiratory infections, for example, sinus infections. Headaches, rash, nausea. Potentially severe  Serious infections. Adalimumab may decrease your ability to fight infection. Inform your doctor of any current or past infection (particularly tuberculosis), or if you are prone to infections such as cold sores or cystitis. Also tell your doctor if you have or have ever had any disease that affects your immune system, such as cancer, human immunodeficiency virus (HIV) infection, acquired immunodeficiency syndrome (AIDS), or viral hepatitis. Try to avoid close contact with anyone with a bad cold, influenza or chest infections, and wash your hands frequently when taking this medication. Avoid dairy foods that are not pasteurised, eggs, meat or poultry that are not adequately cooked and thus pose a risk of salmonella infection. Contact your doctor if you get an infection, or any symptom or sign of an infection, including: fever, lethargy, cough, influenza-like symptoms, warm, burning on passing urine, dental problems, red or painful skin, and open sores on your body.  Your dermatologist may suggest therapy. Your dermatologist should be consulted if you have any concerns about new skin lesions. Before you start taking adalimumab, your dermatologist will go through a checklist for the following:   Tuberculosis, or close contact with someone who has had it. If you develop any symptoms of tuberculosis (e.g. a dry cough that doesnt go away, weight loss, fever, night sweats) call your doctor. You will need to be examined for tuberculosis and have a skin or blood test.   Hepatitis or an HIV infection, or if you think you are at risk of having these. Numbness or tingling or a disease that affects your nervous system like multiple sclerosis. Congestive heart failure, or if you are already being treated for it. If you are scheduled to have major surgery. If you are scheduled to have any type of vaccination. Latex allergy; latex is present in the needle cover only in the prefilled syringe preparation not in the pen. You should make sure that you have any regular tests (e.g. with cervical smears and mammograms) to screen for common cancers if you are at the age when these are recommended. What will happen if I need an operation or dental surgery? Adalimumab may increase your risk of getting an infection after a surgical procedure. You must tell the doctor or dentist that you are taking adalimumab. How will I be monitored for the side effects of adalimumab treatment? You will probably have blood tests before treatment starts and then regularly at follow-up appointments. Monitoring your response and looking out for side effects will take place at regular clinic visits. Can I have immunizations (vaccinations) whilst on adalimumab? Patients on adalimumab should not be given any of the 'live' vaccines such as those for polio, rubella ([place] measles) and yellow fever, although inactivated vaccines are safe.  If you require immuniaation with a live vaccine, adalimumab should be stopped for at least adalimumab, or if you are worried about your treatment, you should speak to your doctor, specialist nurse or pharmacist. For clarke details, look at the drug information sheet which comes as an insert with your prescription for adalimumab. Photo surveillance performed: Yes    Follow-up: to start Humira    This note was created with the assistance of aspeech-recognition program.  Although the intention is to generate a document that actually reflects thecontent of the visit, no guarantees can be provided that every mistake has been identified and corrected by editing.     Electronically signed by Darshan Ackerman MD on 8/20/20 at 2:48 PM EDT

## 2020-08-21 ENCOUNTER — HOSPITAL ENCOUNTER (OUTPATIENT)
Age: 30
Discharge: HOME | End: 2020-08-21
Payer: COMMERCIAL

## 2020-08-21 LAB
ABSOLUTE EOS #: 0.2 K/UL (ref 0–0.4)
ABSOLUTE IMMATURE GRANULOCYTE: ABNORMAL K/UL (ref 0–0.3)
ABSOLUTE LYMPH #: 3.6 K/UL (ref 1–4.8)
ABSOLUTE MONO #: 0.7 K/UL (ref 0.1–1.3)
ALT SERPL-CCNC: 26 U/L (ref 5–33)
AST SERPL-CCNC: 18 U/L
BASOPHILS # BLD: 1 % (ref 0–2)
BASOPHILS ABSOLUTE: 0.1 K/UL (ref 0–0.2)
BUN BLDV-MCNC: 13 MG/DL (ref 6–20)
CREAT SERPL-MCNC: 0.6 MG/DL (ref 0.5–0.9)
DIFFERENTIAL TYPE: ABNORMAL
EOSINOPHILS RELATIVE PERCENT: 2 % (ref 0–4)
GFR AFRICAN AMERICAN: >60 ML/MIN
GFR NON-AFRICAN AMERICAN: >60 ML/MIN
GFR SERPL CREATININE-BSD FRML MDRD: NORMAL ML/MIN/{1.73_M2}
GFR SERPL CREATININE-BSD FRML MDRD: NORMAL ML/MIN/{1.73_M2}
HAV IGM SER IA-ACNC: NONREACTIVE
HCG QUALITATIVE: NEGATIVE
HCT VFR BLD CALC: 40.7 % (ref 36–46)
HEMOGLOBIN: 14 G/DL (ref 12–16)
HEPATITIS B CORE IGM ANTIBODY: NONREACTIVE
HEPATITIS B SURFACE ANTIGEN: NONREACTIVE
HEPATITIS C ANTIBODY: NONREACTIVE
HIV AG/AB: NONREACTIVE
IMMATURE GRANULOCYTES: ABNORMAL %
LYMPHOCYTES # BLD: 28 % (ref 24–44)
MCH RBC QN AUTO: 30.6 PG (ref 26–34)
MCHC RBC AUTO-ENTMCNC: 34.3 G/DL (ref 31–37)
MCV RBC AUTO: 89.2 FL (ref 80–100)
MONOCYTES # BLD: 6 % (ref 1–7)
NRBC AUTOMATED: ABNORMAL PER 100 WBC
PDW BLD-RTO: 14.6 % (ref 11.5–14.9)
PLATELET # BLD: 257 K/UL (ref 150–450)
PLATELET ESTIMATE: ABNORMAL
PMV BLD AUTO: 9.8 FL (ref 6–12)
RBC # BLD: 4.56 M/UL (ref 4–5.2)
RBC # BLD: ABNORMAL 10*6/UL
SEG NEUTROPHILS: 63 % (ref 36–66)
SEGMENTED NEUTROPHILS ABSOLUTE COUNT: 8.2 K/UL (ref 1.3–9.1)
WBC # BLD: 12.9 K/UL (ref 3.5–11)
WBC # BLD: ABNORMAL 10*3/UL

## 2020-08-21 PROCEDURE — 84520 ASSAY OF UREA NITROGEN: CPT

## 2020-08-21 PROCEDURE — 85025 COMPLETE CBC W/AUTO DIFF WBC: CPT

## 2020-08-21 PROCEDURE — 82565 ASSAY OF CREATININE: CPT

## 2020-08-21 PROCEDURE — 84703 CHORIONIC GONADOTROPIN ASSAY: CPT

## 2020-08-21 PROCEDURE — 84450 TRANSFERASE (AST) (SGOT): CPT

## 2020-08-21 PROCEDURE — 86480 TB TEST CELL IMMUN MEASURE: CPT

## 2020-08-21 PROCEDURE — 36415 COLL VENOUS BLD VENIPUNCTURE: CPT

## 2020-08-21 PROCEDURE — 80074 ACUTE HEPATITIS PANEL: CPT

## 2020-08-21 PROCEDURE — 84460 ALANINE AMINO (ALT) (SGPT): CPT

## 2020-08-21 PROCEDURE — 87389 HIV-1 AG W/HIV-1&-2 AB AG IA: CPT

## 2020-08-24 LAB
QUANTI TB GOLD PLUS: NEGATIVE
QUANTI TB1 MINUS NIL: 0.01 IU/ML (ref 0–0.34)
QUANTI TB2 MINUS NIL: 0 IU/ML (ref 0–0.34)
QUANTIFERON MITOGEN: >10 IU/ML
QUANTIFERON NIL: 0.02 IU/ML

## 2020-08-24 RX ORDER — TRIAMCINOLONE ACETONIDE 1 MG/G
CREAM TOPICAL
Qty: 454 G | Refills: 1 | Status: SHIPPED | OUTPATIENT
Start: 2020-08-24 | End: 2020-10-01

## 2020-09-01 ENCOUNTER — TELEPHONE (OUTPATIENT)
Dept: DERMATOLOGY | Age: 30
End: 2020-09-01

## 2020-10-01 RX ORDER — TRIAMCINOLONE ACETONIDE 1 MG/G
CREAM TOPICAL
Qty: 454 G | Refills: 1 | Status: SHIPPED | OUTPATIENT
Start: 2020-10-01 | End: 2021-08-19

## 2020-10-07 ENCOUNTER — TELEPHONE (OUTPATIENT)
Dept: DERMATOLOGY | Age: 30
End: 2020-10-07

## 2020-10-07 NOTE — TELEPHONE ENCOUNTER
Pt called stating she received a letter from [facility] saying they have not received a letter from ordering physician granting appeal rights so they cannot  processed appeal as it is considered invalid for her Humara medication Please advise.

## 2020-11-02 NOTE — TELEPHONE ENCOUNTER
Edgardo Winter called in regards to the appeal. Per Edgardo Winter she called [address] and they notified her that they have not heard back from us. Last information received from Philip Canales was from 10/20 stating they have received the authorization from the patient and that they would keep us updated. Where do we go from here?  Please advise-

## 2020-11-03 ENCOUNTER — TELEPHONE (OUTPATIENT)
Dept: DERMATOLOGY | Age: 30
End: 2020-11-03

## 2020-11-03 NOTE — TELEPHONE ENCOUNTER
Explained to patient that her appeal for Humira was denied and waiting on Dr. Mercy Clements to decide next step. Will call once decision is made. Patient states that she understood.

## 2020-11-03 NOTE — TELEPHONE ENCOUNTER
Why was the appeal denied? This is the first time I'm hearing about the status of the appeal. If because she should be taking cyclosporine, acitretin, or MTX, then please ask for [id number]. If it's because of some signature issue, please remedy this. Please address immediately.

## 2020-11-04 NOTE — TELEPHONE ENCOUNTER
Then appeal denial was sent yesterday. I sent it to you. I also called [employer] to get the denial letter. They are faxing it over.

## 2020-11-04 NOTE — TELEPHONE ENCOUNTER
Pt called today regarding appeal denial. Advised her we are working on it and will call her as soon as we have more information.

## 2020-11-06 NOTE — TELEPHONE ENCOUNTER
Called juwan moreira to set up peer to peer. They will not allow me to set up a peer to peer, you must do it the day the dr is available. MA's are not allowed to call to set them up, it must be the Dr, LPN, or RN. Shauna Iglesias has asked that you call Monday to set this up for her.  [phone]

## 2020-11-09 NOTE — TELEPHONE ENCOUNTER
Called jose d moreira to set up a peer to peer. Informed that since it is past the 30 days it is not eligable for a peer to peer. Prior Wilver Glassing has to be resubmitted.

## 2020-11-13 NOTE — TELEPHONE ENCOUNTER
Verónica Yang is requesting a refill on the following medications:   Requested Prescriptions     Pending Prescriptions Disp Refills    Adalimumab (HUMIRA PEN) 40 MG/0.4ML PNKT 2 each 2     Sig: Inject 40 mg into the skin every 14 days    Adalimumab (HUMIRA PEN-PS/UV/ADOL HS START) 80 MG/0.8ML & 40MG/0.4ML PNKT 3 each 0     Sig: Inject 80 mg SQ Day 1, then 40 mg on day 8, then 1 pen every 2 weeks       Last OV 8/20    No future appointments.

## 2020-11-14 RX ORDER — ADALIMUMAB 40MG/0.4ML
40 KIT SUBCUTANEOUS
Qty: 2 EACH | Refills: 2 | Status: SHIPPED | OUTPATIENT
Start: 2020-11-14 | End: 2021-04-14 | Stop reason: SDUPTHER

## 2020-11-16 NOTE — TELEPHONE ENCOUNTER
[address] received the Humira prescription on 11/13/2020. Karin Portillo stated they will expedite the prescription to be ready by the end of the day on 11/16/2020. Attempted to call patient to notify her that Karin Portillo will be calling her. Pt phone rang but did not go to .

## 2020-12-04 ENCOUNTER — TELEPHONE (OUTPATIENT)
Dept: DERMATOLOGY | Age: 30
End: 2020-12-04

## 2020-12-04 RX ORDER — HYDROXYZINE HYDROCHLORIDE 25 MG/1
25 TABLET, FILM COATED ORAL NIGHTLY
Qty: 30 TABLET | Refills: 0 | Status: SHIPPED | OUTPATIENT
Start: 2020-12-04 | End: 2021-01-11

## 2020-12-04 NOTE — TELEPHONE ENCOUNTER
[facility] on [address] states she is itching like crazy! She is using her topicals but cannot stop itching and wanted to know if there is anything else she can take--possibly orally, that will help with itch. I advised her to put her topicals in the fridge and apply cold. Please advise as to what else she can do for itch. She has not started humira yet, but is scheduled.     Future Appointments   Date Time Provider Maulik Silverman   1/5/2021  2:00 PM SCHEDULE, [facility] [address] D[facility]MATOLOG[facility]  derm [facility]

## 2021-01-11 DIAGNOSIS — L29.9 PRURITUS: ICD-10-CM

## 2021-01-11 RX ORDER — HYDROXYZINE HYDROCHLORIDE 25 MG/1
25 TABLET, FILM COATED ORAL NIGHTLY
Qty: 30 TABLET | Refills: 0 | Status: SHIPPED | OUTPATIENT
Start: 2021-01-11 | End: 2021-03-02

## 2021-01-11 NOTE — TELEPHONE ENCOUNTER
Daisha Cuevas is requesting refill on hydroxyzine.   Future Appointments   Date Time Provider Maulik Silverman   1/12/2021 11:00 AM SCHEDULE, [facility] [address] DERMATOLOGY  derm [facility]

## 2021-01-12 ENCOUNTER — NURSE ONLY (OUTPATIENT)
Dept: DERMATOLOGY | Age: 31
End: 2021-01-12
Payer: COMMERCIAL

## 2021-01-12 DIAGNOSIS — L29.9 PRURITUS: ICD-10-CM

## 2021-01-12 DIAGNOSIS — Z71.89 OTHER SPECIFIED COUNSELING: ICD-10-CM

## 2021-01-12 DIAGNOSIS — L40.9 PSORIASIS: Primary | ICD-10-CM

## 2021-01-12 PROCEDURE — 96372 THER/PROPH/DIAG INJ SC/IM: CPT | Performed by: DERMATOLOGY

## 2021-01-12 RX ORDER — HYDROXYZINE HYDROCHLORIDE 25 MG/1
25 TABLET, FILM COATED ORAL NIGHTLY
Qty: 30 TABLET | Refills: 0 | OUTPATIENT
Start: 2021-01-12 | End: 2021-02-11

## 2021-01-12 NOTE — PROGRESS NOTES
Tra Sanchez came in office today for instruction of 1st injection of Humira. Explained to patient that 1st injection is a double dose, but every injection after that will just be a single dose. Went over brochure with patient on correct injection sites, angle of pen and how long to hold pen in place after hearing the click and transfer of medication begins. One double dose injection given into left upper quadrant of abdomen subcutaneously. Patient tolerated the injection well. Patient was asked to wait 1 hour before leaving office to make sure they did not have a negative reaction to the medication. Patient advised they felt fine after 1 hour of observation and were released.

## 2021-01-20 ENCOUNTER — HOSPITAL ENCOUNTER (OUTPATIENT)
Age: 31
Setting detail: SPECIMEN
Discharge: HOME | End: 2021-01-20
Payer: COMMERCIAL

## 2021-01-20 ENCOUNTER — OFFICE VISIT (OUTPATIENT)
Dept: OBGYN CLINIC | Age: 31
End: 2021-01-20
Payer: COMMERCIAL

## 2021-01-20 VITALS
WEIGHT: 293 LBS | BODY MASS INDEX: 47.09 KG/M2 | TEMPERATURE: 96.6 F | DIASTOLIC BLOOD PRESSURE: 81 MMHG | SYSTOLIC BLOOD PRESSURE: 127 MMHG | HEIGHT: 66 IN | HEART RATE: 106 BPM

## 2021-01-20 DIAGNOSIS — N94.9 VAGINAL DISCOMFORT: Primary | ICD-10-CM

## 2021-01-20 DIAGNOSIS — Z11.3 SCREENING EXAMINATION FOR STD (SEXUALLY TRANSMITTED DISEASE): ICD-10-CM

## 2021-01-20 DIAGNOSIS — L40.9 PSORIASIS: ICD-10-CM

## 2021-01-20 DIAGNOSIS — N76.0 ACUTE VAGINITIS: ICD-10-CM

## 2021-01-20 LAB
BILIRUBIN, POC: ABNORMAL
BLOOD URINE, POC: ABNORMAL
CLARITY, POC: ABNORMAL
COLOR, POC: YELLOW
GLUCOSE URINE, POC: ABNORMAL
KETONES, POC: ABNORMAL
LEUKOCYTE EST, POC: ABNORMAL
NITRITE, POC: ABNORMAL
PH, POC: 8.5
PROTEIN, POC: ABNORMAL
SPECIFIC GRAVITY, POC: 1.01
UROBILINOGEN, POC: ABNORMAL

## 2021-01-20 PROCEDURE — G8484 FLU IMMUNIZE NO ADMIN: HCPCS | Performed by: CLINICAL NURSE SPECIALIST

## 2021-01-20 PROCEDURE — 81003 URINALYSIS AUTO W/O SCOPE: CPT | Performed by: CLINICAL NURSE SPECIALIST

## 2021-01-20 PROCEDURE — 99213 OFFICE O/P EST LOW 20 MIN: CPT | Performed by: CLINICAL NURSE SPECIALIST

## 2021-01-20 PROCEDURE — 4004F PT TOBACCO SCREEN RCVD TLK: CPT | Performed by: CLINICAL NURSE SPECIALIST

## 2021-01-20 PROCEDURE — G8417 CALC BMI ABV UP PARAM F/U: HCPCS | Performed by: CLINICAL NURSE SPECIALIST

## 2021-01-20 PROCEDURE — G8427 DOCREV CUR MEDS BY ELIG CLIN: HCPCS | Performed by: CLINICAL NURSE SPECIALIST

## 2021-01-20 RX ORDER — FLUCONAZOLE 100 MG/1
100 TABLET ORAL DAILY
Qty: 7 TABLET | Refills: 0 | Status: SHIPPED | OUTPATIENT
Start: 2021-01-20 | End: 2021-01-27

## 2021-01-20 RX ORDER — METRONIDAZOLE 500 MG/1
500 TABLET ORAL 2 TIMES DAILY
Qty: 14 TABLET | Refills: 0 | Status: SHIPPED | OUTPATIENT
Start: 2021-01-20 | End: 2021-01-27

## 2021-01-20 SDOH — ECONOMIC STABILITY: TRANSPORTATION INSECURITY
IN THE PAST 12 MONTHS, HAS THE LACK OF TRANSPORTATION KEPT YOU FROM MEDICAL APPOINTMENTS OR FROM GETTING MEDICATIONS?: NO

## 2021-01-20 SDOH — ECONOMIC STABILITY: FOOD INSECURITY: WITHIN THE PAST 12 MONTHS, THE FOOD YOU BOUGHT JUST DIDN'T LAST AND YOU DIDN'T HAVE MONEY TO GET MORE.: NEVER TRUE

## 2021-01-20 SDOH — ECONOMIC STABILITY: FOOD INSECURITY: WITHIN THE PAST 12 MONTHS, YOU WORRIED THAT YOUR FOOD WOULD RUN OUT BEFORE YOU GOT MONEY TO BUY MORE.: NEVER TRUE

## 2021-01-20 ASSESSMENT — ENCOUNTER SYMPTOMS
RESPIRATORY NEGATIVE: 1
GASTROINTESTINAL NEGATIVE: 1
EYES NEGATIVE: 1
ALLERGIC/IMMUNOLOGIC NEGATIVE: 1

## 2021-01-20 ASSESSMENT — PATIENT HEALTH QUESTIONNAIRE - PHQ9: SUM OF ALL RESPONSES TO PHQ QUESTIONS 1-9: 0

## 2021-01-20 NOTE — PROGRESS NOTES
Subjective:      Patient ID:  Danny Delatorre is a 35 y.o. female who presents for   Chief Complaint   Patient presents with    Urinary Tract Infection     pain while urinating , blood in urine    Sexually Transmitted Diseases       HPI    Patient is a 34 yo female who presents for STD screening and states that she has had pain with urination and blood in her urine for the past 3-4 days. Patient states that the pain begins at the end of her steam, and states that she has frequency. Review of Systems   Constitutional: Negative for chills and fever. HENT: Negative. Eyes: Negative. Respiratory: Negative. Cardiovascular: Negative. Gastrointestinal: Negative. Endocrine: Negative. Genitourinary: Positive for dysuria (for the past 3 days) and frequency. Negative for menstrual problem and vaginal discharge. Musculoskeletal: Negative. Skin: Positive for rash (psoriasis). Allergic/Immunologic: Negative. Neurological: Negative. Hematological: Negative. Psychiatric/Behavioral: Negative. /81 (Site: Right Upper Arm, Position: Sitting, Cuff Size: Large Adult)   Pulse 106   Temp 96.6 °F (35.9 °C) (Temporal)   Ht 5' 6\" (1.676 m)   Wt (!) 353 lb (160.1 kg)   BMI 56.98 kg/m²    No LMP recorded.     Family History   Problem Relation Age of Onset    Mental Illness Mother     COPD Mother     No Known Problems Father       Past Medical History:   Diagnosis Date    Essential hypertension 8/22/2019    H/O trichomonal vaginitis     Obesity     NEDA (obstructive sleep apnea) 1/25/2018      Past Surgical History:   Procedure Laterality Date    MOUTH SURGERY        Social History     Socioeconomic History    Marital status: Single     Spouse name: None    Number of children: None    Years of education: None    Highest education level: None   Occupational History    None   Social Needs    Financial resource strain: Not hard at all   [employer] insecurity     Worry: Never true Inability: Never true    Transportation needs     Medical: No     Non-medical: No   Tobacco Use    Smoking status: Current Every Day Smoker     Packs/day: 0.50     Types: Cigarettes    Smokeless tobacco: Never Used   Substance and Sexual Activity    Alcohol use: No    Drug use: No    Sexual activity: Yes     Partners: Male   Lifestyle    Physical activity     Days per week: None     Minutes per session: None    Stress: None   Relationships    Social connections     Talks on phone: None     Gets together: None     Attends Christianity service: None     Active member of club or organization: None     Attends meetings of clubs or organizations: None     Relationship status: None    Intimate partner violence     Fear of current or ex partner: None     Emotionally abused: None     Physically abused: None     Forced sexual activity: None   Other Topics Concern    None   Social History Narrative    None      Current Outpatient Medications   Medication Sig Dispense Refill    metroNIDAZOLE (FLAGYL) 500 MG tablet Take 1 tablet by mouth 2 times daily for 7 days 14 tablet 0    fluconazole (DIFLUCAN) 100 MG tablet Take 1 tablet by mouth daily for 7 days 7 tablet 0    hydrOXYzine (ATARAX) 25 MG tablet TAKE 1 TABLET BY MOUTH NIGHTLY 30 tablet 0    Adalimumab (HUMIRA PEN) 40 MG/0.4ML PNKT Inject 40 mg into the skin every 14 days 2 each 2    Adalimumab (HUMIRA PEN-PS/UV/ADOL HS START) 80 MG/0.8ML & 40MG/0.4ML PNKT Inject 80 mg SQ Day 1, then 40 mg on day 8, then 1 pen every 2 weeks 3 each 0    triamcinolone (KENALOG) 0.1 % cream APPLY TO RASH TWICE DAILY (NOT FACE, ARMPIT OR GROIN) 454 g 1    clobetasol (TEMOVATE) 0.05 % cream Apply topically 2 times daily to active rash 120 g 3     No current facility-administered medications for this visit. Objective:   Physical Exam  Vitals signs reviewed. Constitutional:       Appearance: She is well-developed. HENT:      Head: Normocephalic and atraumatic. Eyes:      Conjunctiva/sclera: Conjunctivae normal.   Neck:      Musculoskeletal: Normal range of motion and neck supple. Cardiovascular:      Rate and Rhythm: Normal rate and regular rhythm. Pulmonary:      Effort: Pulmonary effort is normal.      Breath sounds: Normal breath sounds. Abdominal:      General: Bowel sounds are normal.   Genitourinary:     Vagina: Vaginal discharge (small amount of thin white discharge) present. Musculoskeletal: Normal range of motion. Skin:     General: Skin is warm and dry. Findings: Rash (severe psoriasis) present. Neurological:      Mental Status: She is oriented to person, place, and time. Psychiatric:         Behavior: Behavior normal.         Thought Content: Thought content normal.         Judgment: Judgment normal.         Assessment:      Diagnosis Orders   1. Vaginal discomfort  POCT Urinalysis No Micro (Auto)    C.trachomatis N.gonorrhoeae DNA    VAGINITIS DNA PROBE    metroNIDAZOLE (FLAGYL) 500 MG tablet    fluconazole (DIFLUCAN) 100 MG tablet   2. Screening examination for STD (sexually transmitted disease)  POCT Urinalysis No Micro (Auto)    C.trachomatis N.gonorrhoeae DNA    VAGINITIS DNA PROBE   3. Acute vaginitis     4. Psoriasis             Plan:      Return for as needed. Patient was seen with total face to face time of 20 minutes. More than 50% of this visit was on counseling andeducation regarding the problems listed below and her options. She was also counseled on her preventative health maintenance recommendations and follow-up.     Electronically signed by: Luca Gaffney CNP

## 2021-01-21 DIAGNOSIS — Z11.3 SCREENING EXAMINATION FOR STD (SEXUALLY TRANSMITTED DISEASE): ICD-10-CM

## 2021-01-21 DIAGNOSIS — N94.9 VAGINAL DISCOMFORT: ICD-10-CM

## 2021-01-22 LAB
C TRACH DNA GENITAL QL NAA+PROBE: NEGATIVE
DIRECT EXAM: ABNORMAL
Lab: ABNORMAL
N. GONORRHOEAE DNA: NEGATIVE
SPECIMEN DESCRIPTION: ABNORMAL

## 2021-01-25 ENCOUNTER — TELEPHONE (OUTPATIENT)
Dept: OBGYN CLINIC | Age: 31
End: 2021-01-25

## 2021-03-01 DIAGNOSIS — L29.9 PRURITUS: ICD-10-CM

## 2021-03-01 NOTE — TELEPHONE ENCOUNTER
Future Appointments   Date Time Provider Maulik Silverman   4/14/2021 11:00 AM Annette Capellan MD  derm [facility]

## 2021-03-02 RX ORDER — HYDROXYZINE HYDROCHLORIDE 25 MG/1
25 TABLET, FILM COATED ORAL NIGHTLY
Qty: 30 TABLET | Refills: 0 | Status: SHIPPED | OUTPATIENT
Start: 2021-03-02 | End: 2021-04-01

## 2021-04-14 ENCOUNTER — OFFICE VISIT (OUTPATIENT)
Dept: DERMATOLOGY | Age: 31
End: 2021-04-14
Payer: COMMERCIAL

## 2021-04-14 VITALS
HEART RATE: 107 BPM | TEMPERATURE: 98.1 F | SYSTOLIC BLOOD PRESSURE: 144 MMHG | OXYGEN SATURATION: 97 % | BODY MASS INDEX: 47.09 KG/M2 | WEIGHT: 293 LBS | HEIGHT: 66 IN | DIASTOLIC BLOOD PRESSURE: 81 MMHG

## 2021-04-14 DIAGNOSIS — L40.9 PSORIASIS: Primary | ICD-10-CM

## 2021-04-14 PROCEDURE — G8427 DOCREV CUR MEDS BY ELIG CLIN: HCPCS | Performed by: DERMATOLOGY

## 2021-04-14 PROCEDURE — 99214 OFFICE O/P EST MOD 30 MIN: CPT | Performed by: DERMATOLOGY

## 2021-04-14 PROCEDURE — 4004F PT TOBACCO SCREEN RCVD TLK: CPT | Performed by: DERMATOLOGY

## 2021-04-14 PROCEDURE — G8417 CALC BMI ABV UP PARAM F/U: HCPCS | Performed by: DERMATOLOGY

## 2021-04-14 RX ORDER — ADALIMUMAB 40MG/0.4ML
40 KIT SUBCUTANEOUS
Qty: 2 EACH | Refills: 2 | Status: SHIPPED | OUTPATIENT
Start: 2021-04-14 | End: 2021-10-26 | Stop reason: ALTCHOICE

## 2021-04-14 RX ORDER — CLOBETASOL PROPIONATE 0.5 MG/G
CREAM TOPICAL
Qty: 120 G | Refills: 3 | Status: SHIPPED | OUTPATIENT
Start: 2021-04-14 | End: 2022-04-21 | Stop reason: SDUPTHER

## 2021-04-14 NOTE — PROGRESS NOTES
conversational. Affect is normal.    Cutaneous Exam:  Physical Exam  Head/face,neck, both arms, chest, back, abdomen, digits and/or nails, and limited lower extremities (that which is visible with pants/shorts and shoes/socks on) was examined. Diagnoses/exam findings/medical history pertinent to this visit are listed below:    Assessment and Plan:  Assessment   1. Psoriasis  - persistent thick psoriasis plaques of legs and arms. BSA 15%. Poor response to Humira  - chronic illness, responding to treatment but not yet at goal   - Biologic continuation: Patient understands the increased risk of infection and potential increased risk of malignancy as a result of immunosuppression. Patient denies occurrence of infections aside from common colds or gastroenteritis. Patient was counseled to call if he/she develops any symptoms concerning for infection. Patient understands the need for continued lab monitoring while on the medication. Patient will not receive live vaccines while on the medication. - initiate PA for Taltz given failure of Humira  - clobetasol (TEMOVATE) 0.05 % cream; Apply topically 2 times daily to active rash  Dispense: 120 g; Refill: 3  - Adalimumab (HUMIRA PEN) 40 MG/0.4ML PNKT; Inject 40 mg into the skin every 14 days  Dispense: 2 each; Refill: 2          RTC 4 months    Patient Instructions   - Continue Humira until prior authorization obtained for Taltz  - Continue applying clobetasol to active areas of thick rash  - Follow up in the office in 4 months      This note was created with the assistance of a speech-recognition program.  Although the intention is to generate a document that actually reflects the content of the visit, no guarantees can be provided that every mistake has been identified and corrected byediting.     Electronically signed by Jessica Walters MD on 4/14/21 at 11:12 AM EDT

## 2021-04-14 NOTE — PATIENT INSTRUCTIONS
- Continue Humira until prior authorization obtained for Taltz  - Continue applying clobetasol to active areas of thick rash  - Follow up in the office in 4 months

## 2021-05-06 ENCOUNTER — TELEPHONE (OUTPATIENT)
Dept: DERMATOLOGY | Age: 31
End: 2021-05-06

## 2021-05-06 DIAGNOSIS — L40.9 PSORIASIS: ICD-10-CM

## 2021-05-06 DIAGNOSIS — L29.9 PRURITUS: Primary | ICD-10-CM

## 2021-05-06 RX ORDER — HYDROXYZINE HYDROCHLORIDE 25 MG/1
25 TABLET, FILM COATED ORAL NIGHTLY
Qty: 30 TABLET | Refills: 3 | Status: SHIPPED | OUTPATIENT
Start: 2021-05-06 | End: 2021-06-05

## 2021-05-06 NOTE — TELEPHONE ENCOUNTER
Pt phones to say she is flaring with more small psoriasis patches x2 days. She has been applying clobetasol BID but it is still there. Next Humira injection is Sunday. Advised patient it is not uncommon to have some patches of psor flare prior to next injection day as there is less medication in her system. She may notice the psor calm down after Sunday's injection. Patient also requests refill on her itching medicine--she cannot remember what she was prescribed. [address].

## 2021-05-06 NOTE — TELEPHONE ENCOUNTER
Spoke to the [employer] who is going to push this PA through and get it submitted and marked urgent today

## 2021-05-07 RX ORDER — IXEKIZUMAB 80 MG/ML
INJECTION, SOLUTION SUBCUTANEOUS
Qty: 3 ML | Refills: 0 | Status: SHIPPED | OUTPATIENT
Start: 2021-05-07 | End: 2021-10-26 | Stop reason: ALTCHOICE

## 2021-05-07 RX ORDER — IXEKIZUMAB 80 MG/ML
INJECTION, SOLUTION SUBCUTANEOUS
Qty: 2 ML | Refills: 1 | Status: SHIPPED | OUTPATIENT
Start: 2021-05-07 | End: 2021-07-22

## 2021-05-07 RX ORDER — IXEKIZUMAB 80 MG/ML
INJECTION, SOLUTION SUBCUTANEOUS
Qty: 1 ML | Refills: 1 | Status: SHIPPED | OUTPATIENT
Start: 2021-05-07 | End: 2021-09-06 | Stop reason: SDUPTHER

## 2021-05-07 NOTE — TELEPHONE ENCOUNTER
Fletcher Bautistaore to call the plan and clarify what is approved in the approval that was received.  They will update us, I informed pt to call [facility] to set up delivery of her loading dose of Taltz and once she receives it to give us a call and we can schedule injection training     Also informed pt to not take next Humira dose, she can bring it when she comes to learn [facility] and we can discard it in sharps

## 2021-05-07 NOTE — TELEPHONE ENCOUNTER
LOADING dose approved, I am unable to pend med to you.  [facility]  [address] please

## 2021-05-07 NOTE — TELEPHONE ENCOUNTER
Thanks. I sent the entire script to see if it will just get covered for it all, but understand that only loading dose is approved.

## 2021-05-11 ENCOUNTER — TELEPHONE (OUTPATIENT)
Dept: DERMATOLOGY | Age: 31
End: 2021-05-11

## 2021-05-11 NOTE — TELEPHONE ENCOUNTER
I'm sorry but that's the strongest topical we have! She will get better soon once the Nicolasa Sanchez gets there.  The only other thing would be 2-3 times weekly phototherapy but it won't make a huge difference before she gets the Nicoalsa Sanchez

## 2021-05-11 NOTE — TELEPHONE ENCOUNTER
Spoke w/ [facility] they needed the prescription clarified. They should be shipping it out.  Everything has been taken care of

## 2021-05-12 NOTE — TELEPHONE ENCOUNTER
Called [facility] [employer], then [facility]. Pt is approved for pre-filled syringes not auto-injectors. Spoke w/ Ayse Guerra at [facility] she states that she is approved for the starter #3 syringes from dates 5-7-21 through 5-27-21. She is approved for titration dose for week 4,6,8,10 #2 per 28 days from 5/28/21 through 7/30/21. She is approved for the Maintenance starting at week 12, 1 syringe per 28 days from 7/30/21 through 5/6/22. Her Approval number is [id number]. I spoke to [employer] the pharmacist  at [facility] and he was given the new script of pre-filled syringes with all the appropriate fill dates.

## 2021-05-12 NOTE — TELEPHONE ENCOUNTER
Pt states that she cant get her taltz, I called [facility] and she states that the starter is not covered. I called [facility] they state that the entire taltz is covered.  She will call the insurance and [facility] to see what the problem is

## 2021-05-18 ENCOUNTER — NURSE ONLY (OUTPATIENT)
Dept: DERMATOLOGY | Age: 31
End: 2021-05-18
Payer: COMMERCIAL

## 2021-05-18 VITALS — TEMPERATURE: 97 F

## 2021-05-18 DIAGNOSIS — Z71.89 OTHER SPECIFIED COUNSELING: ICD-10-CM

## 2021-05-18 DIAGNOSIS — L40.9 PSORIASIS: ICD-10-CM

## 2021-05-18 PROCEDURE — 96372 THER/PROPH/DIAG INJ SC/IM: CPT | Performed by: DERMATOLOGY

## 2021-05-18 NOTE — PROGRESS NOTES
Charmayne Albino came in office today for instruction of first injection of Liang Lites. Explained to patient that 1st injection is a double dose, but every injection after that will just be a single dose. Went over brochure with patient on correct injection sites, angle of pen and how long to hold pen in place after hearing the click and transfer of medication begins. One injection given into rt arm and the other was administered into the lt arm subcutaneously. Patient tolerated the injections well. Patient was asked to wait 15 minutes before leaving office to make sure they did not have a negative reaction to the medication. Patient advised they felt fine after 15 minutes of observation and were released.

## 2021-06-01 ENCOUNTER — TELEPHONE (OUTPATIENT)
Dept: DERMATOLOGY | Age: 31
End: 2021-06-01

## 2021-06-01 ENCOUNTER — OFFICE VISIT (OUTPATIENT)
Dept: DERMATOLOGY | Age: 31
End: 2021-06-01
Payer: COMMERCIAL

## 2021-06-01 VITALS — BODY MASS INDEX: 47.09 KG/M2 | HEIGHT: 66 IN | TEMPERATURE: 98.4 F | WEIGHT: 293 LBS

## 2021-06-01 DIAGNOSIS — T80.90XD INJECTION SITE REACTION, SUBSEQUENT ENCOUNTER: Primary | ICD-10-CM

## 2021-06-01 PROCEDURE — 99213 OFFICE O/P EST LOW 20 MIN: CPT | Performed by: DERMATOLOGY

## 2021-06-01 PROCEDURE — G8417 CALC BMI ABV UP PARAM F/U: HCPCS | Performed by: DERMATOLOGY

## 2021-06-01 PROCEDURE — 4004F PT TOBACCO SCREEN RCVD TLK: CPT | Performed by: DERMATOLOGY

## 2021-06-01 PROCEDURE — G8427 DOCREV CUR MEDS BY ELIG CLIN: HCPCS | Performed by: DERMATOLOGY

## 2021-06-01 NOTE — TELEPHONE ENCOUNTER
Sounds like she's had an injection site reaction. It should continue to improve with time. I would recommend switching to Tremfya.  Please PA

## 2021-06-01 NOTE — TELEPHONE ENCOUNTER
Dr. Al Briggs was able to locate [address] [facility] note, patient stated the reaction is 2 weeks post injection and she is stating nervousness about blood clots.      Future Appointments   Date Time Provider Maulik Andra   2021  2:45 Jyoti Bernal MD  derm [facility]   2021 10:00 AM [facility] Constantino [facility]

## 2021-06-02 NOTE — PROGRESS NOTES
Dermatology Patient Note  [address] [address]  [address]  [address] [facility] [address]  Dept: [phone]  Dept Fax: [phone]      VISITDATE: 6/1/2021   REFERRING PROVIDER: No ref. provider found      Karin Vargas is a 35 y.o. female  who presents today in the office for:    Follow-up (pt states she bumped her arm on a doorway at work and then noticed that the arms she did her Taltz injection became red, swollen and slightly itchy; pt was seen at  over weekend and given keflex and ibu)      PERTINENT HISTORY NOT LISTED ABOVE:  Patient presents for evaluation of injection site reaction  - she had her Taltz injections on 5/18/21 and has not noticed any pain, redness, or warmth  - a few days ago her boyfriend noticed \"heat\" coming from her arm - she looked and saw a large red raised plaque at the site of Taltz injection, and a similar one on the other side  - she went to urgent care who prescribed keflex and ibuprofen and bhavik lines around it, now the redness has diminished.  She only took two doses of keflex due to abdominal pain    CURRENT MEDICATIONS:   Current Outpatient Medications   Medication Sig Dispense Refill    ixekizumab (TALTZ) 80 MG/ML SOAJ prefilled syringe Inject 160mg SQ on Day 0 and 80mg SQ at week 2 3 mL 0    ixekizumab (TALTZ) 80 MG/ML SOAJ prefilled syringe Inject 80mg SQ at weeks 4, 6, 8, 10 2 mL 1    ixekizumab (TALTZ) 80 MG/ML SOAJ prefilled syringe Inject 80mg SQ every 4 weeks starting at week 12 1 mL 1    hydrOXYzine (ATARAX) 25 MG tablet Take 1 tablet by mouth nightly 30 tablet 3    clobetasol (TEMOVATE) 0.05 % cream Apply topically 2 times daily to active rash 120 g 3    Adalimumab (HUMIRA PEN) 40 MG/0.4ML PNKT Inject 40 mg into the skin every 14 days (Patient not taking: Reported on 6/1/2021) 2 each 2    triamcinolone (KENALOG) 0.1 % cream APPLY TO RASH TWICE DAILY (NOT FACE, ARMPIT OR GROIN) (Patient not taking: Reported on 4/14/2021) 454 g 1     No current facility-administered medications for this visit. ALLERGIES:   No Known Allergies    SOCIAL HISTORY:  Social History     Tobacco Use    Smoking status: Current Every Day Smoker     Packs/day: 0.50     Types: Cigarettes    Smokeless tobacco: Never Used   Substance Use Topics    Alcohol use: No       Pertinent ROS:  Review of Systems  Skin: Denies any new changing, growing or bleeding lesions or rashes except as described in the HPI   Constitutional: Denies fevers, chills, and malaise. PHYSICAL EXAM:   Temp 98.4 °F (36.9 °C)   Ht 5' 6\" (1.676 m)   Wt (!) 362 lb (164.2 kg)   BMI 58.43 kg/m²     The patient is generally well appearing, well nourished, alert and conversational. Affect is normal.    Cutaneous Exam:  Physical Exam  Sun-exposed skin, which includes the head/face, neck, both arms, digits and/or nails was examined. Diagnoses/exam findings/medical history pertinent to this visit are listed below:    Assessment and Plan:  Assessment   1. Injection site reaction, subsequent encounter  - 17% of patient shave injection site reaction to [address]. Nearly 2 weeks delayed is unusual. It is mostly resolved at this point with slight erythema c/w keratosis pilaris, and mild tenderness  - discussed with patient that I very much doubt this is infectious since it occurred on both arms at the same time  - she is due for next Taltz injection today. Give it in a spot that she can see it (abdomen or thigh) and monitor closely. If she gets another injection site reaction, will need to switch biologics            There are no Patient Instructions on file for this visit. This note was created with the assistance of a speech-recognition program.  Although the intention is to generate a document that actually reflects the content of the visit, no guarantees can be provided that every mistake has been identified and corrected byediting.     Electronically signed by Liset Knowles MD on 6/1/21 at 10:19 PM EDT

## 2021-07-22 DIAGNOSIS — L40.9 PSORIASIS: ICD-10-CM

## 2021-07-22 RX ORDER — IXEKIZUMAB 80 MG/ML
INJECTION, SOLUTION SUBCUTANEOUS
Qty: 2 SYRINGE | Refills: 0 | Status: SHIPPED | OUTPATIENT
Start: 2021-07-22 | End: 2021-08-19

## 2021-08-19 ENCOUNTER — OFFICE VISIT (OUTPATIENT)
Dept: DERMATOLOGY | Age: 31
End: 2021-08-19
Payer: COMMERCIAL

## 2021-08-19 VITALS
BODY MASS INDEX: 59.17 KG/M2 | HEART RATE: 97 BPM | OXYGEN SATURATION: 81 % | SYSTOLIC BLOOD PRESSURE: 129 MMHG | TEMPERATURE: 97.2 F | WEIGHT: 293 LBS | DIASTOLIC BLOOD PRESSURE: 83 MMHG

## 2021-08-19 DIAGNOSIS — L73.9 FOLLICULITIS: ICD-10-CM

## 2021-08-19 DIAGNOSIS — L40.9 PSORIASIS: Primary | ICD-10-CM

## 2021-08-19 DIAGNOSIS — T80.90XD INJECTION SITE REACTION, SUBSEQUENT ENCOUNTER: ICD-10-CM

## 2021-08-19 DIAGNOSIS — J01.81 OTHER ACUTE RECURRENT SINUSITIS: ICD-10-CM

## 2021-08-19 DIAGNOSIS — Z79.899 HIGH RISK MEDICATION USE: ICD-10-CM

## 2021-08-19 PROCEDURE — G8428 CUR MEDS NOT DOCUMENT: HCPCS | Performed by: DERMATOLOGY

## 2021-08-19 PROCEDURE — G8417 CALC BMI ABV UP PARAM F/U: HCPCS | Performed by: DERMATOLOGY

## 2021-08-19 PROCEDURE — 99214 OFFICE O/P EST MOD 30 MIN: CPT | Performed by: DERMATOLOGY

## 2021-08-19 PROCEDURE — 4004F PT TOBACCO SCREEN RCVD TLK: CPT | Performed by: DERMATOLOGY

## 2021-08-19 RX ORDER — AZITHROMYCIN 250 MG/1
250 TABLET, FILM COATED ORAL SEE ADMIN INSTRUCTIONS
Qty: 6 TABLET | Refills: 0 | Status: SHIPPED | OUTPATIENT
Start: 2021-08-19 | End: 2021-08-24

## 2021-08-19 RX ORDER — CLINDAMYCIN PHOSPHATE 10 UG/ML
LOTION TOPICAL
Qty: 60 ML | Refills: 3 | Status: SHIPPED | OUTPATIENT
Start: 2021-08-19

## 2021-08-19 NOTE — PROGRESS NOTES
Dermatology Patient Note  [address] [address]  [address]  [address]  Dept: [phone]  Dept Fax: [phone]      VISITDATE: 8/19/2021   REFERRING PROVIDER: No ref. provider found      Gustabo Griffin is a 35 y.o. female  who presents today in the office for:    Follow-up (follow up on [address])      HISTORY OF PRESENT ILLNESS:  As above. Patient reports getting injection site reactions that swell from the [address] but wants to continue treatment. Uses clobetasol on affected area. Also reports a stuffy nose since starting [address], has seen PCP recently but did not discuss sinus issues or Taltz. MEDICAL PROBLEMS:  Patient Active Problem List    Diagnosis Date Noted    Pharyngitis, acute 03/01/2020    Acute streptococcal pharyngitis 03/01/2020    Essential hypertension 08/22/2019    Morbid obesity with BMI of 50.0-59.9, adult ([facility] 08/22/2019    Polycystic ovarian syndrome 08/22/2019    NEDA (obstructive sleep apnea) 01/25/2018       CURRENT MEDICATIONS:   Current Outpatient Medications   Medication Sig Dispense Refill    azithromycin (ZITHROMAX) 250 MG tablet Take 1 tablet by mouth See Admin Instructions for 5 days 500mg on day 1 followed by 250mg on days 2 - 5 6 tablet 0    clindamycin (CLEOCIN T) 1 % lotion Apply to folliculitis on back daily until resolved 60 mL 3    ixekizumab (TALTZ) 80 MG/ML SOAJ prefilled syringe Inject 80mg SQ every 4 weeks starting at week 12 1 mL 1    clobetasol (TEMOVATE) 0.05 % cream Apply topically 2 times daily to active rash 120 g 3    ixekizumab (TALTZ) 80 MG/ML SOAJ prefilled syringe Inject 160mg SQ on Day 0 and 80mg SQ at week 2 (Patient not taking: Reported on 8/19/2021) 3 mL 0    Adalimumab (HUMIRA PEN) 40 MG/0.4ML PNKT Inject 40 mg into the skin every 14 days (Patient not taking: Reported on 6/1/2021) 2 each 2     No current facility-administered medications for this visit.        ALLERGIES:   No Known Allergies    SOCIAL HISTORY:  Social History     Tobacco Use    Smoking status: Current Every Day Smoker     Packs/day: 0.50     Types: Cigarettes    Smokeless tobacco: Never Used   Substance Use Topics    Alcohol use: No       Pertinent ROS:  Review of Systems  Skin: Denies any new changing, growing or bleeding lesions or rashes except as described in the HPI   Constitutional: Denies fevers, chills, and malaise. PHYSICAL EXAM:   /83   Pulse 97   Temp 97.2 °F (36.2 °C)   Wt (!) 366 lb 9.6 oz (166.3 kg)   LMP 08/05/2021   SpO2 (!) 81%   BMI 59.17 kg/m²     The patient is generally well appearing, well nourished, alert and conversational. Affect is normal.    Cutaneous Exam:  Physical Exam  Waist-up skin: the head/face,neck, both arms, chest, back, abdomen, digits and/or nails, was examined. Facial covering was not removed during examination. Diagnoses/exam findings/medical history pertinent to this visit are listed below:    Assessment:   Diagnosis Orders   1. Psoriasis     2. Injection site reaction, subsequent encounter     3. Folliculitis  clindamycin (CLEOCIN T) 1 % lotion   4. Other acute recurrent sinusitis  azithromycin (ZITHROMAX) 250 MG tablet   5. High risk medication use  Quantiferon (R) TB Gold, (Incubated)    CBC Auto Differential    Comprehensive Metabolic Panel        Plan:  Psoriasis  - stable chronic illness  - mostly clear on Taltz  - continue Taltz injections  Biologic continuation: Patient understands the increased risk of infection and potential increased risk of malignancy as a result of immunosuppression. Patient denies occurrence of infections aside from common colds or gastroenteritis. Patient was counseled to call if he/she develops any symptoms concerning for infection. Patient understands the need for continued lab monitoring while on the medication. Patient will not receive live vaccines while on the medication.      High-risk medication use  quant gold and labs today    Injection site, subsequent encounter  -  Patient tolerates, reaction seems to be lessening with time    Recurrent sinusitis  - side effect from treatment  - if does not resolve, may need to consider alternate treatment  - azithromycin (ZITHROMAX) 250 MG tablet; Take 1 tablet by mouth See Admin Instructions for 5 days 500mg on day 1 followed by 250mg on days 2 - 5  Dispense: 6 tablet; Refill: 0    Folliculitis of back  - side effect from treatment  - start clindamycin lotion      RTC 4 months    No future appointments. There are no Patient Instructions on file for this visit. This note was created with the assistance of a speech-recognition program.  Although the intention is to generate a document that actually reflects the content of the visit, no guarantees can be provided that every mistake has been identified and corrected by editing. I, Dr. Rossi Javier, personally performed the services described in this documentation, as scribed by Cira Cristina in my presence, and it is both accurate and complete.    Electronically signed by Christina Mcdowell MD on 8/19/21 at 10:23 AM FLORA

## 2021-08-31 DIAGNOSIS — L40.9 PSORIASIS: ICD-10-CM

## 2021-08-31 RX ORDER — IXEKIZUMAB 80 MG/ML
INJECTION, SOLUTION SUBCUTANEOUS
Qty: 1 ML | Refills: 1 | OUTPATIENT
Start: 2021-08-31

## 2021-08-31 NOTE — TELEPHONE ENCOUNTER
Spoke with PT she understands that the Labs need to be done. PT said she will go get them done tomorrow.

## 2021-08-31 NOTE — TELEPHONE ENCOUNTER
[facility] is requesting a refill on the following medications:   Requested Prescriptions     Pending Prescriptions Disp Refills    ixekizumab (TALTZ) 80 MG/ML SOAJ prefilled syringe 1 mL 1     Sig: Inject 80mg SQ every 4 weeks starting at week 12       Last OV 8/19    Future Appointments   Date Time Provider Maulik Silverman   12/20/2021 10:30 AM MD adonay Barrera derm TOP

## 2021-09-02 ENCOUNTER — HOSPITAL ENCOUNTER (OUTPATIENT)
Age: 31
Discharge: HOME | End: 2021-09-02
Payer: COMMERCIAL

## 2021-09-02 DIAGNOSIS — Z79.899 HIGH RISK MEDICATION USE: ICD-10-CM

## 2021-09-02 LAB
ABSOLUTE EOS #: 0.3 K/UL (ref 0–0.4)
ABSOLUTE IMMATURE GRANULOCYTE: ABNORMAL K/UL (ref 0–0.3)
ABSOLUTE LYMPH #: 3.4 K/UL (ref 1–4.8)
ABSOLUTE MONO #: 0.5 K/UL (ref 0.1–1.3)
ALBUMIN SERPL-MCNC: 4.1 G/DL (ref 3.5–5.2)
ALBUMIN/GLOBULIN RATIO: ABNORMAL (ref 1–2.5)
ALP BLD-CCNC: 70 U/L (ref 35–104)
ALT SERPL-CCNC: 24 U/L (ref 5–33)
ANION GAP SERPL CALCULATED.3IONS-SCNC: 6 MMOL/L (ref 9–17)
AST SERPL-CCNC: 18 U/L
BASOPHILS # BLD: 1 % (ref 0–2)
BASOPHILS ABSOLUTE: 0.1 K/UL (ref 0–0.2)
BILIRUB SERPL-MCNC: 0.24 MG/DL (ref 0.3–1.2)
BUN BLDV-MCNC: 10 MG/DL (ref 6–20)
BUN/CREAT BLD: ABNORMAL (ref 9–20)
CALCIUM SERPL-MCNC: 9.1 MG/DL (ref 8.6–10.4)
CHLORIDE BLD-SCNC: 101 MMOL/L (ref 98–107)
CO2: 28 MMOL/L (ref 20–31)
CREAT SERPL-MCNC: 0.54 MG/DL (ref 0.5–0.9)
DIFFERENTIAL TYPE: ABNORMAL
EOSINOPHILS RELATIVE PERCENT: 2 % (ref 0–4)
GFR AFRICAN AMERICAN: >60 ML/MIN
GFR NON-AFRICAN AMERICAN: >60 ML/MIN
GFR SERPL CREATININE-BSD FRML MDRD: ABNORMAL ML/MIN/{1.73_M2}
GFR SERPL CREATININE-BSD FRML MDRD: ABNORMAL ML/MIN/{1.73_M2}
GLUCOSE BLD-MCNC: 96 MG/DL (ref 70–99)
HCT VFR BLD CALC: 39.7 % (ref 36–46)
HEMOGLOBIN: 13.1 G/DL (ref 12–16)
IMMATURE GRANULOCYTES: ABNORMAL %
LYMPHOCYTES # BLD: 30 % (ref 24–44)
MCH RBC QN AUTO: 29.9 PG (ref 26–34)
MCHC RBC AUTO-ENTMCNC: 33 G/DL (ref 31–37)
MCV RBC AUTO: 90.4 FL (ref 80–100)
MONOCYTES # BLD: 5 % (ref 1–7)
NRBC AUTOMATED: ABNORMAL PER 100 WBC
PDW BLD-RTO: 14.5 % (ref 11.5–14.9)
PLATELET # BLD: 287 K/UL (ref 150–450)
PLATELET ESTIMATE: ABNORMAL
PMV BLD AUTO: 9.2 FL (ref 6–12)
POTASSIUM SERPL-SCNC: 4.7 MMOL/L (ref 3.7–5.3)
RBC # BLD: 4.39 M/UL (ref 4–5.2)
RBC # BLD: ABNORMAL 10*6/UL
SEG NEUTROPHILS: 62 % (ref 36–66)
SEGMENTED NEUTROPHILS ABSOLUTE COUNT: 7 K/UL (ref 1.3–9.1)
SODIUM BLD-SCNC: 135 MMOL/L (ref 135–144)
TOTAL PROTEIN: 7.5 G/DL (ref 6.4–8.3)
WBC # BLD: 11.3 K/UL (ref 3.5–11)
WBC # BLD: ABNORMAL 10*3/UL

## 2021-09-02 PROCEDURE — 85025 COMPLETE CBC W/AUTO DIFF WBC: CPT

## 2021-09-02 PROCEDURE — 80053 COMPREHEN METABOLIC PANEL: CPT

## 2021-09-02 PROCEDURE — 36415 COLL VENOUS BLD VENIPUNCTURE: CPT

## 2021-09-02 PROCEDURE — 86480 TB TEST CELL IMMUN MEASURE: CPT

## 2021-09-05 LAB
QUANTI TB GOLD PLUS: NEGATIVE
QUANTI TB1 MINUS NIL: 0.01 IU/ML (ref 0–0.34)
QUANTI TB2 MINUS NIL: 0.01 IU/ML (ref 0–0.34)
QUANTIFERON MITOGEN: >10 IU/ML
QUANTIFERON NIL: 0.02 IU/ML

## 2021-09-06 RX ORDER — IXEKIZUMAB 80 MG/ML
INJECTION, SOLUTION SUBCUTANEOUS
Qty: 1 ML | Refills: 5 | Status: SHIPPED | OUTPATIENT
Start: 2021-09-06 | End: 2021-09-07 | Stop reason: SDUPTHER

## 2021-09-07 DIAGNOSIS — L40.9 PSORIASIS: ICD-10-CM

## 2021-09-07 RX ORDER — IXEKIZUMAB 80 MG/ML
INJECTION, SOLUTION SUBCUTANEOUS
Qty: 1 ML | Refills: 5 | Status: SHIPPED | OUTPATIENT
Start: 2021-09-07 | End: 2021-10-26 | Stop reason: ALTCHOICE

## 2021-09-27 ENCOUNTER — TELEPHONE (OUTPATIENT)
Dept: DERMATOLOGY | Age: 31
End: 2021-09-27

## 2021-09-27 NOTE — TELEPHONE ENCOUNTER
Patient states she has been doing her Taltz every 2 weeks. Last prescription was for every 4 weeks. Medication to go to [address] [facility].

## 2021-09-27 NOTE — TELEPHONE ENCOUNTER
That is correct. Irwin Rodríguez has an initial dose, a titration dose, and a maintenance dose.  She must be onto maintenance dose now

## 2021-10-11 ENCOUNTER — TELEPHONE (OUTPATIENT)
Dept: DERMATOLOGY | Age: 31
End: 2021-10-11

## 2021-10-11 NOTE — TELEPHONE ENCOUNTER
Patient says the Zithromax is not helping her sinusitis. Patient c/o congestion and stuffiness since she started taking Taltz.

## 2021-10-26 ENCOUNTER — TELEPHONE (OUTPATIENT)
Dept: DERMATOLOGY | Age: 31
End: 2021-10-26

## 2021-10-26 RX ORDER — RISANKIZUMAB-RZAA 75 MG/0.83
KIT SUBCUTANEOUS
Qty: 2 EACH | Refills: 1 | Status: SHIPPED | OUTPATIENT
Start: 2021-10-26 | End: 2022-04-21 | Stop reason: ALTCHOICE

## 2021-10-26 RX ORDER — RISANKIZUMAB-RZAA 75 MG/0.83
KIT SUBCUTANEOUS
Qty: 4 EACH | Refills: 0 | Status: SHIPPED | OUTPATIENT
Start: 2021-10-26 | End: 2022-04-21 | Stop reason: ALTCHOICE

## 2021-10-28 ENCOUNTER — TELEPHONE (OUTPATIENT)
Dept: DERMATOLOGY | Age: 31
End: 2021-10-28

## 2021-10-28 DIAGNOSIS — L40.9 PSORIASIS: Primary | ICD-10-CM

## 2021-11-09 ENCOUNTER — TELEPHONE (OUTPATIENT)
Dept: DERMATOLOGY | Age: 31
End: 2021-11-09

## 2021-11-17 ENCOUNTER — TELEPHONE (OUTPATIENT)
Dept: DERMATOLOGY | Age: 31
End: 2021-11-17

## 2021-11-17 DIAGNOSIS — L40.9 PSORIASIS: Primary | ICD-10-CM

## 2021-11-18 RX ORDER — RISANKIZUMAB-RZAA 150 MG/ML
INJECTION SUBCUTANEOUS
Qty: 1 ML | Refills: 1 | Status: SHIPPED | OUTPATIENT
Start: 2021-11-18 | End: 2022-04-21 | Stop reason: SDUPTHER

## 2021-11-18 RX ORDER — RISANKIZUMAB-RZAA 150 MG/ML
INJECTION SUBCUTANEOUS
Qty: 2 ML | Refills: 0 | Status: SHIPPED | OUTPATIENT
Start: 2021-11-18 | End: 2022-04-21 | Stop reason: ALTCHOICE

## 2021-11-24 ENCOUNTER — NURSE ONLY (OUTPATIENT)
Dept: DERMATOLOGY | Age: 31
End: 2021-11-24
Payer: COMMERCIAL

## 2021-11-24 VITALS — TEMPERATURE: 97.4 F

## 2021-11-24 DIAGNOSIS — Z71.89 OTHER SPECIFIED COUNSELING: Primary | ICD-10-CM

## 2021-11-24 DIAGNOSIS — L40.9 PSORIASIS: ICD-10-CM

## 2021-11-24 PROCEDURE — 96372 THER/PROPH/DIAG INJ SC/IM: CPT | Performed by: DERMATOLOGY

## 2021-12-20 ENCOUNTER — OFFICE VISIT (OUTPATIENT)
Dept: DERMATOLOGY | Age: 31
End: 2021-12-20
Payer: COMMERCIAL

## 2021-12-20 VITALS
HEART RATE: 104 BPM | HEIGHT: 67 IN | DIASTOLIC BLOOD PRESSURE: 89 MMHG | WEIGHT: 293 LBS | SYSTOLIC BLOOD PRESSURE: 136 MMHG | BODY MASS INDEX: 45.99 KG/M2

## 2021-12-20 DIAGNOSIS — L40.9 PSORIASIS: Primary | ICD-10-CM

## 2021-12-20 DIAGNOSIS — Z79.899 HIGH RISK MEDICATION USE: ICD-10-CM

## 2021-12-20 PROCEDURE — G8417 CALC BMI ABV UP PARAM F/U: HCPCS | Performed by: DERMATOLOGY

## 2021-12-20 PROCEDURE — 4004F PT TOBACCO SCREEN RCVD TLK: CPT | Performed by: DERMATOLOGY

## 2021-12-20 PROCEDURE — 99214 OFFICE O/P EST MOD 30 MIN: CPT | Performed by: DERMATOLOGY

## 2021-12-20 PROCEDURE — G8427 DOCREV CUR MEDS BY ELIG CLIN: HCPCS | Performed by: DERMATOLOGY

## 2021-12-20 PROCEDURE — G8484 FLU IMMUNIZE NO ADMIN: HCPCS | Performed by: DERMATOLOGY

## 2021-12-20 NOTE — PROGRESS NOTES
Dermatology Patient Note  [facility] [address]  [address]  [address] [facility] [address]  Dept: [phone]  Dept Fax: [phone]: 12/20/2021   REFERRING PROVIDER: No ref. provider found      Martina Murdock is a 28 y.o. female  who presents today in the office for:    Follow-up (Taltz)      HISTORY OF PRESENT ILLNESS:  F/u for psoriasis. Patient has received two doses of Skyrizi. Currently has patches on elbows, feet, and knees. No SE. No recent infections or hospitalizations. She states that her sinusitis has been doing better since stopping Taltz. MEDICAL PROBLEMS:  Patient Active Problem List    Diagnosis Date Noted    Pharyngitis, acute 03/01/2020    Acute streptococcal pharyngitis 03/01/2020    Essential hypertension 08/22/2019    Morbid obesity with BMI of 50.0-59.9, adult ([facility] 08/22/2019    Polycystic ovarian syndrome 08/22/2019    NEDA (obstructive sleep apnea) 01/25/2018       CURRENT MEDICATIONS:   Current Outpatient Medications   Medication Sig Dispense Refill    Risankizumab-rzaa (SKYRIZI PEN) 150 MG/ML SOAJ Inject 150mg SQ every 12 weeks 1 mL 1    Risankizumab-rzaa (SKYRIZI PEN) 150 MG/ML SOAJ Inject 150mg SQ at weeks 0 and 4 2 mL 0    Risankizumab-rzaa 150 MG/ML SOSY Inject 150mg SQ at weeks 0 and 4 2 mL 0    Risankizumab-rzaa 150 MG/ML SOSY Inject 150mg (2 syringes) SQ every 12 weeks 1 mL 1    risankizumab-rzaa (SKYRIZI, 150 MG DOSE,) 75 MG/0.83ML PSKT injection Inject 150mg (2 syringes) SQ at weeks 0 and 4 4 each 0    risankizumab-rzaa (SKYRIZI, 150 MG DOSE,) 75 MG/0.83ML PSKT injection Inject 150mg (2 syringes) SQ every 12 weeks 2 each 1    clindamycin (CLEOCIN T) 1 % lotion Apply to folliculitis on back daily until resolved 60 mL 3    clobetasol (TEMOVATE) 0.05 % cream Apply topically 2 times daily to active rash 120 g 3     No current facility-administered medications for this visit.        ALLERGIES:   No Known Allergies    SOCIAL HISTORY:  Social History     Tobacco Use    Smoking status: Current Every Day Smoker     Packs/day: 0.50     Types: Cigarettes    Smokeless tobacco: Never Used   Substance Use Topics    Alcohol use: No       Pertinent ROS:  Review of Systems  Skin: Denies any new changing, growing or bleeding lesions or rashes except as described in the HPI   Constitutional: Denies fevers, chills, and malaise. PHYSICAL EXAM:   /89 (Site: Left Upper Arm, Position: Sitting, Cuff Size: Medium Adult)   Pulse 104   Ht 5' 7\" (1.702 m)   Wt (!) 354 lb (160.6 kg)   BMI 55.44 kg/m²     The patient is generally well appearing, well nourished, alert and conversational. Affect is normal.    Cutaneous Exam:  Physical Exam  Sun exposed + limited LEs: Head/face,neck, both arms, digits and/or nails and legs visible with pants/shorts and shoes/socks on was examined. Facial covering was not removed during examination. Diagnoses/exam findings/medical history pertinent to this visit are listed below:    Assessment:   Diagnosis Orders   1. Psoriasis     2. High risk medication use          Plan:  Psoriasis, persistent patches on elbows and knees  - did not tolerate Taltz and had inadequate response to Humira  - chronic illness, responding to treatment but not yet at goal   - ILK in reserve for persistent patches   Biologic continuation: Patient understands the increased risk of infection and potential increased risk of malignancy as a result of immunosuppression. Patient denies occurrence of infections aside from common colds or gastroenteritis. Patient was counseled to call if he/she develops any symptoms concerning for infection. Patient understands the need for continued lab monitoring while on the medication. Patient will not receive live vaccines while on the medication.     - continue Skyrizi   - continue clobetasol to active patches    High risk medication use   - labs when due (sept)    RTC 4 months     Future Appointments

## 2022-04-20 ENCOUNTER — TELEPHONE (OUTPATIENT)
Dept: DERMATOLOGY | Age: 32
End: 2022-04-20

## 2022-04-20 NOTE — TELEPHONE ENCOUNTER
I was unable to confirm appointment scheduled for 4/21/2022 in the Dermatology Department. The patient was unavailable and the voicemail was full.

## 2022-04-21 ENCOUNTER — OFFICE VISIT (OUTPATIENT)
Dept: DERMATOLOGY | Age: 32
End: 2022-04-21
Payer: COMMERCIAL

## 2022-04-21 VITALS
BODY MASS INDEX: 45.99 KG/M2 | OXYGEN SATURATION: 98 % | DIASTOLIC BLOOD PRESSURE: 81 MMHG | SYSTOLIC BLOOD PRESSURE: 134 MMHG | TEMPERATURE: 97.2 F | WEIGHT: 293 LBS | HEART RATE: 100 BPM | HEIGHT: 67 IN

## 2022-04-21 DIAGNOSIS — L40.9 PSORIASIS: Primary | ICD-10-CM

## 2022-04-21 PROCEDURE — 99213 OFFICE O/P EST LOW 20 MIN: CPT | Performed by: DERMATOLOGY

## 2022-04-21 PROCEDURE — 4004F PT TOBACCO SCREEN RCVD TLK: CPT | Performed by: DERMATOLOGY

## 2022-04-21 PROCEDURE — G8417 CALC BMI ABV UP PARAM F/U: HCPCS | Performed by: DERMATOLOGY

## 2022-04-21 PROCEDURE — G8427 DOCREV CUR MEDS BY ELIG CLIN: HCPCS | Performed by: DERMATOLOGY

## 2022-04-21 RX ORDER — RISANKIZUMAB-RZAA 150 MG/ML
INJECTION SUBCUTANEOUS
Qty: 1 ML | Refills: 2 | Status: SHIPPED | OUTPATIENT
Start: 2022-04-21 | End: 2022-10-17 | Stop reason: SDUPTHER

## 2022-04-21 RX ORDER — CLOBETASOL PROPIONATE 0.5 MG/G
CREAM TOPICAL
Qty: 120 G | Refills: 3 | Status: SHIPPED | OUTPATIENT
Start: 2022-04-21

## 2022-04-21 NOTE — PROGRESS NOTES
Dermatology Patient Note  [facility] [address]  [place] [address]  Dept: [phone]  Dept Fax: [phone]      VISITDATE: 2022   REFERRING PROVIDER: No ref. provider found      Rolla Apgar is a 28 y.o. female  who presents today in the office for:    Follow-up (Patient here for a follow up on Skyrizi-states doing well. Last inj was on easter)      HISTORY OF PRESENT ILLNESS:  As above. The spots are improving. No SE.    MEDICAL PROBLEMS:  Patient Active Problem List    Diagnosis Date Noted    Pharyngitis, acute 2020    Acute streptococcal pharyngitis 2020    Essential hypertension 2019    Morbid [facility] with [facility] 50.0-59.9, [facility] 2019    Polycystic ovarian syndrome 2019    NEDA (obstructive sleep apnea) 2018       CURRENT MEDICATIONS:   Current Outpatient Medications   Medication Sig Dispense Refill    clobetasol (TEMOVATE) 0.05 % cream Apply topically 2 times daily to active rash 120 g 3    Risankizumab-rzaa (SKYRIZI PEN) 150 MG/ML SOAJ Inject 150mg SQ every 12 weeks 1 mL 2    clindamycin (CLEOCIN T) 1 % lotion Apply to folliculitis on back daily until resolved 60 mL 3     No current facility-administered medications for this visit. ALLERGIES:   No Known Allergies    SOCIAL HISTORY:  Social History     Tobacco Use    Smoking status: Current Every Day Smoker     Packs/day: 0.50     Types: Cigarettes    Smokeless tobacco: Never Used   Substance Use Topics    Alcohol use: No       Pertinent ROS:  Review of Systems  Skin: Denies any new changing, growing or bleeding lesions or rashes except as described in the HPI   Constitutional: Denies fevers, chills, and malaise.     PHYSICAL EXAM:   /81   Pulse 100   Temp 97.2 °F (36.2 °C)   Ht 5' 7\" (1.702 m)   Wt (!) 346 lb (156.9 kg)   SpO2 98%   BMI 54.19 kg/m²     The patient is generally well appearing, well nourished, alert and conversational. Affect is normal.    Cutaneous Exam:  Physical Exam  Focused exam of elbows, lower extremities was performed    Facial covering was not removed during examination. Diagnoses/exam findings/medical history pertinent to this visit are listed below:    Assessment:   Diagnosis Orders   1. Psoriasis  clobetasol (TEMOVATE) 0.05 % cream    Risankizumab-rzaa (SKYRIZI PEN) 150 MG/ML SOAJ        Plan:  Psoriasis  - stable chronic illness   - mostly clear on Skyrizi  Biologic continuation: Patient understands the increased risk of infection and potential increased risk of malignancy as a result of immunosuppression. Patient denies occurrence of infections aside from common colds or gastroenteritis. Patient was counseled to call if he/she develops any symptoms concerning for infection. Patient understands the need for continued lab monitoring while on the medication. Patient will not receive live vaccines while on the medication. - continue Skyrizi  - refill clobetasol ointment for focal papules on knees and elbows    RTC 6 months, will need labs    Future Appointments   Date Time Provider [facility]   9/29/2022 11:45 AM Jorge Arteaga MD  derm [facility]         There are no Patient Instructions on file for this visit. I, Kareen Suarez, personally scribed the services dictated to me by Dr. Shelley Tam in this documentation. I, Dr. Shelley Tam, personally performed the services described in this documentation, as scribed by Yesica Barroso in my presence, and it is both accurate and complete.     Electronically signed by Jorge Arteaga MD on 4/21/2022 at 1:07 PM

## 2022-09-21 ENCOUNTER — OFFICE VISIT (OUTPATIENT)
Dept: INTERNAL MEDICINE CLINIC | Age: 32
End: 2022-09-21
Payer: COMMERCIAL

## 2022-09-21 VITALS
SYSTOLIC BLOOD PRESSURE: 132 MMHG | WEIGHT: 293 LBS | BODY MASS INDEX: 59.67 KG/M2 | DIASTOLIC BLOOD PRESSURE: 94 MMHG | OXYGEN SATURATION: 94 % | HEART RATE: 112 BPM

## 2022-09-21 DIAGNOSIS — I10 ESSENTIAL HYPERTENSION: Primary | ICD-10-CM

## 2022-09-21 DIAGNOSIS — E66.01 MORBID OBESITY WITH BMI OF 50.0-59.9, ADULT (HCC): ICD-10-CM

## 2022-09-21 DIAGNOSIS — Z00.00 WELL ADULT EXAM: ICD-10-CM

## 2022-09-21 PROCEDURE — G8417 CALC BMI ABV UP PARAM F/U: HCPCS | Performed by: INTERNAL MEDICINE

## 2022-09-21 PROCEDURE — 99203 OFFICE O/P NEW LOW 30 MIN: CPT | Performed by: INTERNAL MEDICINE

## 2022-09-21 PROCEDURE — 4004F PT TOBACCO SCREEN RCVD TLK: CPT | Performed by: INTERNAL MEDICINE

## 2022-09-21 PROCEDURE — G8427 DOCREV CUR MEDS BY ELIG CLIN: HCPCS | Performed by: INTERNAL MEDICINE

## 2022-09-21 RX ORDER — LISINOPRIL 10 MG/1
10 TABLET ORAL DAILY
Qty: 30 TABLET | Refills: 5 | Status: SHIPPED | OUTPATIENT
Start: 2022-09-21

## 2022-09-21 SDOH — ECONOMIC STABILITY: FOOD INSECURITY: WITHIN THE PAST 12 MONTHS, YOU WORRIED THAT YOUR FOOD WOULD RUN OUT BEFORE YOU GOT MONEY TO BUY MORE.: NEVER TRUE

## 2022-09-21 SDOH — ECONOMIC STABILITY: FOOD INSECURITY: WITHIN THE PAST 12 MONTHS, THE FOOD YOU BOUGHT JUST DIDN'T LAST AND YOU DIDN'T HAVE MONEY TO GET MORE.: NEVER TRUE

## 2022-09-21 ASSESSMENT — PATIENT HEALTH QUESTIONNAIRE - PHQ9
SUM OF ALL RESPONSES TO PHQ QUESTIONS 1-9: 0
1. LITTLE INTEREST OR PLEASURE IN DOING THINGS: 0
SUM OF ALL RESPONSES TO PHQ9 QUESTIONS 1 & 2: 0
SUM OF ALL RESPONSES TO PHQ QUESTIONS 1-9: 0
2. FEELING DOWN, DEPRESSED OR HOPELESS: 0

## 2022-09-21 ASSESSMENT — ENCOUNTER SYMPTOMS
EYES NEGATIVE: 1
RESPIRATORY NEGATIVE: 1
GASTROINTESTINAL NEGATIVE: 1

## 2022-09-21 ASSESSMENT — SOCIAL DETERMINANTS OF HEALTH (SDOH): HOW HARD IS IT FOR YOU TO PAY FOR THE VERY BASICS LIKE FOOD, HOUSING, MEDICAL CARE, AND HEATING?: NOT HARD AT ALL

## 2022-09-21 NOTE — PROGRESS NOTES
[address]  [place] [address]  Dept: [phone]  Dept Fax: [phone]    Anni Avelar is a 28 y.o. female who presents today for her medicalconditions/complaints as noted below. Anni Avelar is c/o of Hypertension (NP, ) and Health Maintenance (refused)      HPI:     Hypertension  This is a chronic problem. The current episode started more than 1 year ago. The problem is uncontrolled. Risk factors for coronary artery disease include obesity and smoking/tobacco exposure. Past treatments include ACE inhibitors (records show she was on lisinopril in the past). Compliance problems include psychosocial issues (not seen physician in several years). Treated for psoriasis. Past Medical History:   Diagnosis Date    Essential hypertension 8/22/2019    H/O trichomonal vaginitis     Obesity     NEDA (obstructive sleep apnea) 1/25/2018        Current Outpatient Medications   Medication Sig Dispense Refill    lisinopril (PRINIVIL;ZESTRIL) 10 MG tablet Take 1 tablet by mouth daily 30 tablet 5    clobetasol (TEMOVATE) 0.05 % cream Apply topically 2 times daily to active rash 120 g 3    Risankizumab-rzaa (SKYRIZI PEN) 150 MG/ML SOAJ Inject 150mg SQ every 12 weeks 1 mL 2    clindamycin (CLEOCIN T) 1 % lotion Apply to folliculitis on back daily until resolved (Patient not taking: Reported on 9/21/2022) 60 mL 3     No current facility-administered medications for this visit.      No Known Allergies    Health Maintenance   Topic Date Due    COVID-19 Vaccine (1) Never done    Varicella vaccine (1 of 2 - 2-dose childhood series) Never done    Pneumococcal 0-64 years Vaccine (1 - PCV) Never done    DTaP/Tdap/Td vaccine (1 - Tdap) Never done    Cervical cancer screen  09/24/2021    Depression Screen  01/20/2022    Flu vaccine (1) Never done    Hepatitis C screen  Completed    HIV screen  Completed    Hepatitis A vaccine  Aged Out    Hepatitis B vaccine  Aged Out    [facility] adult exam  Basic Metabolic Panel    Lipid Panel    CBC with Auto Differential          Plan:      No follow-ups on file. Orders Placed This Encounter   Medications    lisinopril (PRINIVIL;ZESTRIL) 10 MG tablet     Sig: Take 1 tablet by mouth daily     Dispense:  30 tablet     Refill:  5     Orders Placed This Encounter   Procedures    Basic Metabolic Panel     Standing Status:   Future     Standing Expiration Date:   9/21/2023    Lipid Panel     Standing Status:   Future     Standing Expiration Date:   9/21/2023    CBC with Auto Differential     Standing Status:   Future     Standing Expiration Date:   9/21/2023            Patient given educational materials - see patient instructions. Discussed use, benefit, and side effects of prescribed medications. All patientquestions answered. Pt voiced understanding.     Electronically signed by Jorge Reyes MD on 9/21/2022at 1:42 PM

## 2022-09-29 ENCOUNTER — OFFICE VISIT (OUTPATIENT)
Dept: DERMATOLOGY | Age: 32
End: 2022-09-29
Payer: COMMERCIAL

## 2022-09-29 VITALS
BODY MASS INDEX: 45.99 KG/M2 | HEIGHT: 67 IN | SYSTOLIC BLOOD PRESSURE: 136 MMHG | HEART RATE: 101 BPM | DIASTOLIC BLOOD PRESSURE: 87 MMHG | TEMPERATURE: 97.7 F | WEIGHT: 293 LBS | OXYGEN SATURATION: 95 %

## 2022-09-29 DIAGNOSIS — L40.9 PSORIASIS: Primary | ICD-10-CM

## 2022-09-29 DIAGNOSIS — Z79.899 HIGH RISK MEDICATION USE: ICD-10-CM

## 2022-09-29 PROCEDURE — 4004F PT TOBACCO SCREEN RCVD TLK: CPT | Performed by: DERMATOLOGY

## 2022-09-29 PROCEDURE — G8427 DOCREV CUR MEDS BY ELIG CLIN: HCPCS | Performed by: DERMATOLOGY

## 2022-09-29 PROCEDURE — G8417 CALC BMI ABV UP PARAM F/U: HCPCS | Performed by: DERMATOLOGY

## 2022-09-29 PROCEDURE — 99213 OFFICE O/P EST LOW 20 MIN: CPT | Performed by: DERMATOLOGY

## 2022-09-29 NOTE — PROGRESS NOTES
Dermatology Patient Note  [address]  [facility]  Dept: [phone]  Dept Fax: [phone]      VISITDATE: 9/29/2022   REFERRING PROVIDER: No ref. provider found      Tristan Lopez is a 33 y.o. female  who presents today in the office for:    Follow-up (Karl Roque - skin doing well, no new concerns)      HISTORY OF PRESENT ILLNESS:  Psoriasis f/u on Skyrizi. Denies SE. No recent infections or recent hospitalizations, cough as resolved. Psoriasis has significantly improved with Skyrizi. Has minor patches on knees, currently using clobetasol. Patient is inquiring about using a tanning bed. MEDICAL PROBLEMS:  Patient Active Problem List    Diagnosis Date Noted    Pharyngitis, acute 03/01/2020    Acute streptococcal pharyngitis 03/01/2020    Essential hypertension 08/22/2019    Morbid [facility] with [facility] 50.0-59.9, [facility] 08/22/2019    Polycystic ovarian syndrome 08/22/2019    NEDA (obstructive sleep apnea) 01/25/2018       CURRENT MEDICATIONS:   Current Outpatient Medications   Medication Sig Dispense Refill    lisinopril (PRINIVIL;ZESTRIL) 10 MG tablet Take 1 tablet by mouth daily 30 tablet 5    Risankizumab-rzaa (SKYRIZI PEN) 150 MG/ML SOAJ Inject 150mg SQ every 12 weeks 1 mL 2    clindamycin (CLEOCIN T) 1 % lotion Apply to folliculitis on back daily until resolved 60 mL 3    clobetasol (TEMOVATE) 0.05 % cream Apply topically 2 times daily to active rash (Patient not taking: Reported on 9/29/2022) 120 g 3     No current facility-administered medications for this visit.        ALLERGIES:   No Known Allergies    SOCIAL HISTORY:  Social History     Tobacco Use    Smoking status: Every Day     Packs/day: 0.50     Types: Cigarettes    Smokeless tobacco: Never   Substance Use Topics    Alcohol use: No       Pertinent ROS:  Review of Systems  Skin: Denies any new changing, growing or bleeding lesions or rashes except as described in the HPI   Constitutional: Denies fevers, chills, and malaise. PHYSICAL EXAM:   /87   Pulse (!) 101   Temp 97.7 °F (36.5 °C) (Skin)   Ht 5' 7\" (1.702 m)   Wt (!) 388 lb (176 kg)   SpO2 95%   BMI 60.77 kg/m²     The patient is generally well appearing, well nourished, alert and conversational. Affect is normal.    Cutaneous Exam:  Physical Exam  Sun-exposed skin: head/face, neck, both arms, digits and nails were examined. Facial covering was not removed during examination. Diagnoses/exam findings/medical history pertinent to this visit are listed below:    Assessment:   Diagnosis Orders   1. Psoriasis        2. High risk medication use             Plan:  Psoriasis   - stable chronic illness  - continue Skyrizi, refill after labs  - continue clobetasol as needed for flares  Biologic continuation: Patient understands the increased risk of infection and potential increased risk of malignancy as a result of immunosuppression. Patient denies occurrence of infections aside from common colds or gastroenteritis. Patient was counseled to call if he/she develops any symptoms concerning for infection. Patient understands the need for continued lab monitoring while on the medication. Patient will not receive live vaccines while on the medication. High risk medication use  Quantiferon TB Minus NIL (no units)   Date Value   09/02/2021 Negative     - labs due     RTC 6 months     Future Appointments   Date Time Provider Maulik Silverman   12/21/2022  1:15 PM Yousif Quintero MD [address]         There are no Patient Instructions on file for this visit. Nolan Sheets, personally scribed the services dictated to me by Dr. Lissette Sanford in this documentation. I, Dr. Lissette Sanford, personally performed the services described in this documentation, as scribed by [facility] in my presence, and it is both accurate and complete.     Electronically signed by Reina Shepard MD on 9/30/2022 at 12:30 PM

## 2022-10-12 ENCOUNTER — HOSPITAL ENCOUNTER (OUTPATIENT)
Age: 32
Discharge: HOME | End: 2022-10-12
Payer: COMMERCIAL

## 2022-10-12 DIAGNOSIS — Z00.00 WELL ADULT EXAM: ICD-10-CM

## 2022-10-12 DIAGNOSIS — Z79.899 HIGH RISK MEDICATION USE: ICD-10-CM

## 2022-10-12 LAB
ABSOLUTE EOS #: 0.2 K/UL (ref 0–0.4)
ABSOLUTE EOS #: 0.2 K/UL (ref 0–0.4)
ABSOLUTE LYMPH #: 3.5 K/UL (ref 1–4.8)
ABSOLUTE LYMPH #: 3.5 K/UL (ref 1–4.8)
ABSOLUTE MONO #: 0.5 K/UL (ref 0.1–1.3)
ABSOLUTE MONO #: 0.5 K/UL (ref 0.1–1.3)
ALBUMIN SERPL-MCNC: 4.3 G/DL (ref 3.5–5.2)
ALP BLD-CCNC: 80 U/L (ref 35–104)
ALT SERPL-CCNC: 21 U/L (ref 5–33)
ANION GAP SERPL CALCULATED.3IONS-SCNC: 9 MMOL/L (ref 9–17)
ANION GAP SERPL CALCULATED.3IONS-SCNC: 9 MMOL/L (ref 9–17)
AST SERPL-CCNC: 20 U/L
BASOPHILS # BLD: 1 % (ref 0–2)
BASOPHILS # BLD: 1 % (ref 0–2)
BASOPHILS ABSOLUTE: 0.1 K/UL (ref 0–0.2)
BASOPHILS ABSOLUTE: 0.1 K/UL (ref 0–0.2)
BILIRUB SERPL-MCNC: 0.3 MG/DL (ref 0.3–1.2)
BUN BLDV-MCNC: 8 MG/DL (ref 6–20)
BUN BLDV-MCNC: 8 MG/DL (ref 6–20)
CALCIUM SERPL-MCNC: 9.3 MG/DL (ref 8.6–10.4)
CALCIUM SERPL-MCNC: 9.3 MG/DL (ref 8.6–10.4)
CHLORIDE BLD-SCNC: 101 MMOL/L (ref 98–107)
CHLORIDE BLD-SCNC: 101 MMOL/L (ref 98–107)
CHOLESTEROL/HDL RATIO: 4.6
CHOLESTEROL/HDL RATIO: 4.6
CHOLESTEROL: 167 MG/DL
CHOLESTEROL: 167 MG/DL
CO2: 29 MMOL/L (ref 20–31)
CO2: 29 MMOL/L (ref 20–31)
CREAT SERPL-MCNC: 0.54 MG/DL (ref 0.5–0.9)
CREAT SERPL-MCNC: 0.54 MG/DL (ref 0.5–0.9)
EOSINOPHILS RELATIVE PERCENT: 2 % (ref 0–4)
EOSINOPHILS RELATIVE PERCENT: 2 % (ref 0–4)
GFR SERPL CREATININE-BSD FRML MDRD: >60 ML/MIN/1.73M2
GFR SERPL CREATININE-BSD FRML MDRD: >60 ML/MIN/1.73M2
GLUCOSE BLD-MCNC: 106 MG/DL (ref 70–99)
GLUCOSE BLD-MCNC: 106 MG/DL (ref 70–99)
HCT VFR BLD CALC: 40.4 % (ref 36–46)
HCT VFR BLD CALC: 40.4 % (ref 36–46)
HDLC SERPL-MCNC: 36 MG/DL
HDLC SERPL-MCNC: 36 MG/DL
HEMOGLOBIN: 13.1 G/DL (ref 12–16)
HEMOGLOBIN: 13.1 G/DL (ref 12–16)
LDL CHOLESTEROL: 106 MG/DL (ref 0–130)
LDL CHOLESTEROL: 106 MG/DL (ref 0–130)
LYMPHOCYTES # BLD: 30 % (ref 24–44)
LYMPHOCYTES # BLD: 30 % (ref 24–44)
MCH RBC QN AUTO: 29.5 PG (ref 26–34)
MCH RBC QN AUTO: 29.5 PG (ref 26–34)
MCHC RBC AUTO-ENTMCNC: 32.4 G/DL (ref 31–37)
MCHC RBC AUTO-ENTMCNC: 32.4 G/DL (ref 31–37)
MCV RBC AUTO: 90.9 FL (ref 80–100)
MCV RBC AUTO: 90.9 FL (ref 80–100)
MONOCYTES # BLD: 5 % (ref 1–7)
MONOCYTES # BLD: 5 % (ref 1–7)
PDW BLD-RTO: 14.4 % (ref 11.5–14.9)
PDW BLD-RTO: 14.4 % (ref 11.5–14.9)
PLATELET # BLD: 301 K/UL (ref 150–450)
PLATELET # BLD: 301 K/UL (ref 150–450)
PMV BLD AUTO: 9.9 FL (ref 6–12)
PMV BLD AUTO: 9.9 FL (ref 6–12)
POTASSIUM SERPL-SCNC: 5 MMOL/L (ref 3.7–5.3)
POTASSIUM SERPL-SCNC: 5 MMOL/L (ref 3.7–5.3)
RBC # BLD: 4.45 M/UL (ref 4–5.2)
RBC # BLD: 4.45 M/UL (ref 4–5.2)
SEG NEUTROPHILS: 62 % (ref 36–66)
SEG NEUTROPHILS: 62 % (ref 36–66)
SEGMENTED NEUTROPHILS ABSOLUTE COUNT: 7.3 K/UL (ref 1.3–9.1)
SEGMENTED NEUTROPHILS ABSOLUTE COUNT: 7.3 K/UL (ref 1.3–9.1)
SODIUM BLD-SCNC: 139 MMOL/L (ref 135–144)
SODIUM BLD-SCNC: 139 MMOL/L (ref 135–144)
TOTAL PROTEIN: 7.7 G/DL (ref 6.4–8.3)
TRIGL SERPL-MCNC: 125 MG/DL
TRIGL SERPL-MCNC: 125 MG/DL
WBC # BLD: 11.5 K/UL (ref 3.5–11)
WBC # BLD: 11.5 K/UL (ref 3.5–11)

## 2022-10-12 PROCEDURE — 85025 COMPLETE CBC W/AUTO DIFF WBC: CPT

## 2022-10-12 PROCEDURE — 86480 TB TEST CELL IMMUN MEASURE: CPT

## 2022-10-12 PROCEDURE — 36415 COLL VENOUS BLD VENIPUNCTURE: CPT

## 2022-10-12 PROCEDURE — 80048 BASIC METABOLIC PNL TOTAL CA: CPT

## 2022-10-12 PROCEDURE — 80053 COMPREHEN METABOLIC PANEL: CPT

## 2022-10-12 PROCEDURE — 80061 LIPID PANEL: CPT

## 2022-10-17 LAB
QUANTI TB GOLD PLUS: NEGATIVE
QUANTI TB1 MINUS NIL: 0 IU/ML (ref 0–0.34)
QUANTI TB2 MINUS NIL: 0 IU/ML (ref 0–0.34)
QUANTIFERON MITOGEN: >10 IU/ML
QUANTIFERON NIL: 0.04 IU/ML

## 2022-10-17 RX ORDER — RISANKIZUMAB-RZAA 150 MG/ML
INJECTION SUBCUTANEOUS
Qty: 1 ML | Refills: 2 | Status: SHIPPED | OUTPATIENT
Start: 2022-10-17

## 2022-11-22 ENCOUNTER — TELEPHONE (OUTPATIENT)
Dept: INTERNAL MEDICINE CLINIC | Age: 32
End: 2022-11-22

## 2022-11-22 NOTE — TELEPHONE ENCOUNTER
Bilateral leg numbness and pain 8/10 today. No other symptoms such as chest pain, dizziness. Patient feels okay just in pain, legs are tingly. Scheduled patient for appointment on Wednesday.

## 2022-12-21 ENCOUNTER — OFFICE VISIT (OUTPATIENT)
Dept: INTERNAL MEDICINE CLINIC | Age: 32
End: 2022-12-21
Payer: COMMERCIAL

## 2022-12-21 VITALS
BODY MASS INDEX: 59.99 KG/M2 | HEART RATE: 104 BPM | SYSTOLIC BLOOD PRESSURE: 132 MMHG | OXYGEN SATURATION: 96 % | DIASTOLIC BLOOD PRESSURE: 84 MMHG | WEIGHT: 293 LBS

## 2022-12-21 DIAGNOSIS — J20.9 ACUTE BRONCHITIS, UNSPECIFIED ORGANISM: ICD-10-CM

## 2022-12-21 DIAGNOSIS — U07.1 COVID: Primary | ICD-10-CM

## 2022-12-21 PROCEDURE — 3074F SYST BP LT 130 MM HG: CPT | Performed by: INTERNAL MEDICINE

## 2022-12-21 PROCEDURE — 99213 OFFICE O/P EST LOW 20 MIN: CPT | Performed by: INTERNAL MEDICINE

## 2022-12-21 PROCEDURE — G8417 CALC BMI ABV UP PARAM F/U: HCPCS | Performed by: INTERNAL MEDICINE

## 2022-12-21 PROCEDURE — 3078F DIAST BP <80 MM HG: CPT | Performed by: INTERNAL MEDICINE

## 2022-12-21 PROCEDURE — 4004F PT TOBACCO SCREEN RCVD TLK: CPT | Performed by: INTERNAL MEDICINE

## 2022-12-21 PROCEDURE — G8427 DOCREV CUR MEDS BY ELIG CLIN: HCPCS | Performed by: INTERNAL MEDICINE

## 2022-12-21 PROCEDURE — G8484 FLU IMMUNIZE NO ADMIN: HCPCS | Performed by: INTERNAL MEDICINE

## 2022-12-21 RX ORDER — AZITHROMYCIN 250 MG/1
TABLET, FILM COATED ORAL
Qty: 1 PACKET | Refills: 0 | Status: SHIPPED | OUTPATIENT
Start: 2022-12-21

## 2022-12-21 RX ORDER — METHYLPREDNISOLONE 4 MG/1
TABLET ORAL
Qty: 1 KIT | Refills: 0 | Status: SHIPPED | OUTPATIENT
Start: 2022-12-21 | End: 2022-12-27

## 2022-12-21 ASSESSMENT — ENCOUNTER SYMPTOMS
EYES NEGATIVE: 1
COUGH: 1
GASTROINTESTINAL NEGATIVE: 1
FLU SYMPTOMS: 1

## 2022-12-21 NOTE — PROGRESS NOTES
[address]  [place] [address]  Dept: [phone]  Dept Fax: [phone]    Vickey Barnett is a 33 y.o. female who presents today for her medicalconditions/complaints as noted below. Vickey Barnett is c/o of 3 Month Follow-Up (Tested + for covid 12/9/22 still feels sick, tired, cough and nasal congestion) and Leg Pain (Left thigh pain, feels numb)    Tested positive for COVID not vaccinated, not prescribed Paxlovid. HPI:     Influenza  This is a new problem. The current episode started 1 to 4 weeks ago. The problem occurs constantly. Associated symptoms include congestion, coughing, fatigue and myalgias. Nothing aggravates the symptoms. She has tried nothing for the symptoms. The treatment provided no relief. Past Medical History:   Diagnosis Date    Essential hypertension 8/22/2019    H/O trichomonal vaginitis     Obesity     NEDA (obstructive sleep apnea) 1/25/2018        Current Outpatient Medications   Medication Sig Dispense Refill    methylPREDNISolone (MEDROL DOSEPACK) 4 MG tablet Take by mouth. 1 kit 0    azithromycin (ZITHROMAX) 250 MG tablet Take 2 tabs (500 mg) on Day 1, and take 1 tab (250 mg) on days 2 through 5. 1 packet 0    Risankizumab-rzaa (SKYRIZI PEN) 150 MG/ML SOAJ Inject 150mg SQ every 12 weeks (Patient not taking: Reported on 12/21/2022) 1 mL 2    lisinopril (PRINIVIL;ZESTRIL) 10 MG tablet Take 1 tablet by mouth daily (Patient not taking: Reported on 12/21/2022) 30 tablet 5    clobetasol (TEMOVATE) 0.05 % cream Apply topically 2 times daily to active rash (Patient not taking: No sig reported) 120 g 3    clindamycin (CLEOCIN T) 1 % lotion Apply to folliculitis on back daily until resolved (Patient not taking: Reported on 12/21/2022) 60 mL 3     No current facility-administered medications for this visit.      No Known Allergies    Health Maintenance   Topic Date Due    COVID-19 Vaccine (1) Never done    Varicella vaccine (1 of 2 - 2-dose childhood series) Never done    Pneumococcal 0-64 years Vaccine (1 - PCV) Never done    DTaP/Tdap/Td vaccine (1 - Tdap) Never done    Cervical cancer screen  09/24/2021    Flu vaccine (1) Never done    Depression Screen  09/21/2023    Hepatitis C screen  Completed    HIV screen  Completed    Hepatitis A vaccine  Aged Out    Hib vaccine  Aged Out    Meningococcal (ACWY) vaccine  Aged Out       Subjective:      Review of Systems   Constitutional:  Positive for fatigue. HENT:  Positive for congestion. Eyes: Negative. Respiratory:  Positive for cough. Cardiovascular: Negative. Gastrointestinal: Negative. Genitourinary: Negative. Musculoskeletal:  Positive for myalgias. Skin: Negative. Neurological: Negative. Psychiatric/Behavioral: Negative. All other systems reviewed and are negative. Objective:     Physical Exam  Vitals reviewed. Constitutional:       Appearance: Normal appearance. She is well-developed. HENT:      Head: Normocephalic and atraumatic. Eyes:      Conjunctiva/sclera: Conjunctivae normal.      Pupils: Pupils are equal, round, and reactive to light. Neck:      Thyroid: No thyromegaly. Vascular: No JVD. Cardiovascular:      Rate and Rhythm: Normal rate and regular rhythm. Heart sounds: S1 normal and S2 normal. No murmur heard. Pulmonary:      Effort: No respiratory distress. Breath sounds: Normal breath sounds. Abdominal:      General: Bowel sounds are normal.      Palpations: Abdomen is soft. There is no mass. Tenderness: There is no abdominal tenderness. Musculoskeletal:         General: No tenderness. Normal range of motion. Cervical back: Neck supple. Lymphadenopathy:      Cervical: No cervical adenopathy. Skin:     General: Skin is warm and dry. Neurological:      Mental Status: She is alert and oriented to person, place, and time. Cranial Nerves: No cranial nerve deficit.       Deep Tendon Reflexes: Reflexes are normal and symmetric. Psychiatric:         Behavior: Behavior normal.     /84 (Site: Right Lower Arm, Position: Sitting)   Pulse (!) 104   Wt (!) 383 lb (173.7 kg)   SpO2 96%   BMI 59.99 kg/m²       Assessment:       Diagnosis Orders   1. COVID  methylPREDNISolone (MEDROL DOSEPACK) 4 MG tablet      2. Acute bronchitis, unspecified organism  azithromycin (ZITHROMAX) 250 MG tablet          Plan:      No follow-ups on file. Orders Placed This Encounter   Medications    methylPREDNISolone (MEDROL DOSEPACK) 4 MG tablet     Sig: Take by mouth. Dispense:  1 kit     Refill:  0    azithromycin (ZITHROMAX) 250 MG tablet     Sig: Take 2 tabs (500 mg) on Day 1, and take 1 tab (250 mg) on days 2 through 5. Dispense:  1 packet     Refill:  0     No orders of the defined types were placed in this encounter. Patient given educational materials - see patient instructions. Discussed use, benefit, and side effects of prescribed medications. All patientquestions answered. Pt voiced understanding.     Electronically signed by Killian Hager MD on 12/21/2022at 2:02 PM

## 2022-12-21 NOTE — PROGRESS NOTES
Visit Information    Have you changed or started any medications since your last visit including any over-the-counter medicines, vitamins, or herbal medicines? no   Are you having any side effects from any of your medications? -  no  Have you stopped taking any of your medications? Is so, why? -  no    Have you seen any other physician or provider since your last visit? Yes - Records Obtained  Have you had any other diagnostic tests since your last visit? Yes - Records Obtained  Have you been seen in the emergency room and/or had an admission to a hospital since we last saw you? Yes - Records Obtained  Have you had your routine dental cleaning in the past 6 months? no    Have you activated your [employer] account? If not, what are your barriers?  Yes     Patient Care Team:  Ayla Garza MD as PCP - General (Internal Medicine)  Ayla Garza MD as PCP - Luan Gay Provider    Medical History Review  Past Medical, Family, and Social History reviewed and does contribute to the patient presenting condition    Health Maintenance   Topic Date Due    COVID-19 Vaccine (1) Never done    Varicella vaccine (1 of 2 - 2-dose childhood series) Never done    Pneumococcal 0-64 years Vaccine (1 - PCV) Never done    DTaP/Tdap/Td vaccine (1 - Tdap) Never done    Cervical cancer screen  09/24/2021    Flu vaccine (1) Never done    Depression Screen  09/21/2023    Hepatitis C screen  Completed    HIV screen  Completed    Hepatitis A vaccine  Aged Out    Hib vaccine  Aged Out    Meningococcal (ACWY) vaccine  Aged Out

## 2023-03-17 ENCOUNTER — TELEMEDICINE (OUTPATIENT)
Dept: DERMATOLOGY | Age: 33
End: 2023-03-17

## 2023-03-17 DIAGNOSIS — L40.9 PSORIASIS: Primary | ICD-10-CM

## 2023-03-17 DIAGNOSIS — Z79.899 HIGH RISK MEDICATION USE: ICD-10-CM

## 2023-03-17 NOTE — PROGRESS NOTES
TELEHEALTH EVALUATION -- Audio/Visual (During [facility]- public [facility] emergency)    Dermatology Patient Note  [address]  [address] [address]  Dept: [phone]  Dept Fax: [phone]      VISITDATE: 3/17/2023   REFERRING PROVIDER: No ref. provider found      Marquise Koch is a 33 y.o. female  who presents today in the office for:    No chief complaint on file. PERTINENT HISTORY NOT LISTED ABOVE:  Psoriasis f/u, on skyrizi  Denies SE  Psoriasis clear except a few papules on elbows  Using clobetasol    CURRENT MEDICATIONS:   Current Outpatient Medications   Medication Sig Dispense Refill    azithromycin (ZITHROMAX) 250 MG tablet Take 2 tabs (500 mg) on Day 1, and take 1 tab (250 mg) on days 2 through 5. 1 packet 0    Risankizumab-rzaa (SKYRIZI PEN) 150 MG/ML SOAJ Inject 150mg SQ every 12 weeks (Patient not taking: Reported on 12/21/2022) 1 mL 2    lisinopril (PRINIVIL;ZESTRIL) 10 MG tablet Take 1 tablet by mouth daily (Patient not taking: Reported on 12/21/2022) 30 tablet 5    clobetasol (TEMOVATE) 0.05 % cream Apply topically 2 times daily to active rash (Patient not taking: No sig reported) 120 g 3    clindamycin (CLEOCIN T) 1 % lotion Apply to folliculitis on back daily until resolved (Patient not taking: Reported on 12/21/2022) 60 mL 3     No current facility-administered medications for this visit. ALLERGIES:   No Known Allergies    SOCIAL HISTORY:  Social History     Tobacco Use    Smoking status: Every Day     Packs/day: 0.50     Types: Cigarettes    Smokeless tobacco: Never   Substance Use Topics    Alcohol use: No       Pertinent ROS:  Review of Systems  Skin: Denies any new changing, growing or bleeding lesions or rashes except as described in the HPI   Constitutional: Denies fevers, chills, and malaise.     PHYSICAL EXAM:     Due to this being a TeleHealth encounter, evaluation of the following organ systems is limited: Vitals/Constitutional/EENT/Resp/CV/GI//MS/Neuro/Skin/Heme-Lymph-Imm. In particular examination of the skin is limited by video quality and patient available technology. There were no vitals taken for this visit. The patient is generally well appearing, well nourished, alert and conversational. Affect is normal.    Cutaneous Exam:  Physical Exam  Face, neck, and elbows were examined. Diagnoses/exam findings/medical history pertinent to this visit are listed below:    Assessment and Plan:  Assessment   1. Psoriasis  - stable chronic illness   - clear aside from small papules of elbows. Can consider ILK next visit if not resolved  - refills of Skyrizi  Biologic continuation: Patient understands the increased risk of infection and potential increased risk of malignancy as a result of immunosuppression. Patient denies occurrence of infections aside from common colds or gastroenteritis. Patient was counseled to call if he/she develops any symptoms concerning for infection. Patient understands the need for continued lab monitoring while on the medication. Patient will not receive live vaccines while on the medication. 2. High risk medication use  Quantiferon TB Minus NIL (no units)   Date Value   10/12/2022 Negative             RTC 6 months in person    There are no Patient Instructions on file for this visit. This note was created with the assistance of a speech-recognition program.  Although the intention is to generate a document that actually reflects the content of the visit, no guarantees can be provided that every mistake has been identified and corrected byediting.     Pursuant to the emergency declaration under the [address] [place] [address] and the [employer] Act, this Virtual  Visit was conducted, with patient's consent, to reduce the patient's risk of exposure to COVID-19 and provide continuity of care for an established patient. Services were provided through a video synchronous discussion virtually to substitute for in-person clinic visit.      Electronically signed by Romaine Rodrigues MD on 3/17/23 at 8:45 AM EDT

## 2023-04-20 ENCOUNTER — TELEPHONE (OUTPATIENT)
Dept: DERMATOLOGY | Age: 33
End: 2023-04-20

## 2023-05-03 ENCOUNTER — TELEPHONE (OUTPATIENT)
Dept: DERMATOLOGY | Age: 33
End: 2023-05-03

## 2023-05-08 ENCOUNTER — TELEPHONE (OUTPATIENT)
Dept: DERMATOLOGY | Age: 33
End: 2023-05-08

## 2023-06-10 DIAGNOSIS — L40.9 PSORIASIS: ICD-10-CM

## 2023-06-12 RX ORDER — CLOBETASOL PROPIONATE 0.5 MG/G
CREAM TOPICAL
Qty: 120 G | Refills: 3 | OUTPATIENT
Start: 2023-06-12

## 2023-10-02 ENCOUNTER — OFFICE VISIT (OUTPATIENT)
Dept: DERMATOLOGY | Age: 33
End: 2023-10-02
Payer: COMMERCIAL

## 2023-10-02 VITALS
HEART RATE: 80 BPM | SYSTOLIC BLOOD PRESSURE: 139 MMHG | DIASTOLIC BLOOD PRESSURE: 79 MMHG | TEMPERATURE: 98.1 F | WEIGHT: 293 LBS | BODY MASS INDEX: 45.99 KG/M2 | HEIGHT: 67 IN

## 2023-10-02 DIAGNOSIS — L40.9 PSORIASIS: Primary | ICD-10-CM

## 2023-10-02 DIAGNOSIS — Z79.899 HIGH RISK MEDICATION USE: ICD-10-CM

## 2023-10-02 PROCEDURE — 3078F DIAST BP <80 MM HG: CPT | Performed by: DERMATOLOGY

## 2023-10-02 PROCEDURE — 99213 OFFICE O/P EST LOW 20 MIN: CPT | Performed by: DERMATOLOGY

## 2023-10-02 PROCEDURE — 4004F PT TOBACCO SCREEN RCVD TLK: CPT | Performed by: DERMATOLOGY

## 2023-10-02 PROCEDURE — G8417 CALC BMI ABV UP PARAM F/U: HCPCS | Performed by: DERMATOLOGY

## 2023-10-02 PROCEDURE — G8484 FLU IMMUNIZE NO ADMIN: HCPCS | Performed by: DERMATOLOGY

## 2023-10-02 PROCEDURE — 3075F SYST BP GE 130 - 139MM HG: CPT | Performed by: DERMATOLOGY

## 2023-10-02 PROCEDURE — G8427 DOCREV CUR MEDS BY ELIG CLIN: HCPCS | Performed by: DERMATOLOGY

## 2023-10-02 NOTE — PROGRESS NOTES
Dermatology Patient Note  [address]  [address] [place] [address] [address] [address]  Dept: [phone]  Dept Fax: [phone]      VISITDATE: 10/2/2023   REFERRING PROVIDER: No ref. provider found      Frankie Fraga is a 29 y.o. female  who presents today in the office for:    F/U psoriasis (Pt stated that the clobetasol lotion and cream are working)      HISTORY OF PRESENT ILLNESS:  As above. 6 month follow up for psoriasis. Patient presented with small papules but otherwise stable and continued on Skyrizi at last virtual visit, 3/17/23. Patient states she is doing well on Skyrizi, only one dose taken in July. Only one plaque on right elbow managed with topicals. MEDICAL PROBLEMS:  Patient Active Problem List    Diagnosis Date Noted    Pharyngitis, acute 03/01/2020    Acute streptococcal pharyngitis 03/01/2020    Essential hypertension 08/22/2019    Morbid obesity with BMI of 50.0-59.9, adult ([facility]) 08/22/2019    Polycystic ovarian syndrome 08/22/2019    NEDA (obstructive sleep apnea) 01/25/2018       CURRENT MEDICATIONS:   Current Outpatient Medications   Medication Sig Dispense Refill    clobetasol (TEMOVATE) 0.05 % cream APPLY 1 GRAM TOPICALLY TO ACTIVE RASH TWICE DAILY 120 g 3    Risankizumab-rzaa (SKYRIZI PEN) 150 MG/ML SOAJ Inject 150mg SQ every 12 weeks 1 mL 2    lisinopril (PRINIVIL;ZESTRIL) 10 MG tablet Take 1 tablet by mouth daily 30 tablet 5    clindamycin (CLEOCIN T) 1 % lotion Apply to folliculitis on back daily until resolved 60 mL 3    azithromycin (ZITHROMAX) 250 MG tablet Take 2 tabs (500 mg) on Day 1, and take 1 tab (250 mg) on days 2 through 5. (Patient not taking: Reported on 10/2/2023) 1 packet 0     No current facility-administered medications for this visit.        ALLERGIES:   No Known Allergies    SOCIAL HISTORY:  Social History     Tobacco Use    Smoking status: Every Day     Packs/day: .5

## 2023-10-03 ENCOUNTER — HOSPITAL ENCOUNTER (OUTPATIENT)
Age: 33
Discharge: HOME | End: 2023-10-03
Payer: COMMERCIAL

## 2023-10-03 DIAGNOSIS — Z79.899 HIGH RISK MEDICATION USE: ICD-10-CM

## 2023-10-03 PROCEDURE — 36415 COLL VENOUS BLD VENIPUNCTURE: CPT

## 2023-10-03 PROCEDURE — 86480 TB TEST CELL IMMUN MEASURE: CPT

## 2023-10-06 LAB
QUANTI TB GOLD PLUS: NEGATIVE
QUANTI TB1 MINUS NIL: 0 IU/ML (ref 0–0.34)
QUANTI TB2 MINUS NIL: 0.01 IU/ML (ref 0–0.34)
QUANTIFERON MITOGEN: 9.23 IU/ML
QUANTIFERON NIL: 0.03 IU/ML

## 2023-10-06 RX ORDER — RISANKIZUMAB-RZAA 150 MG/ML
INJECTION SUBCUTANEOUS
Qty: 1 ML | Refills: 2 | Status: SHIPPED | OUTPATIENT
Start: 2023-10-06

## 2024-03-21 NOTE — PROGRESS NOTES
Dermatology Patient Note  [facility] DERMATOLOGY  [address]  [address]  [facility] [address]  Dept: [phone]  Dept Fax: [phone]      VISITDATE: 4/4/2024   REFERRING PROVIDER: No ref. provider found      Radha Espinoza is a 33 y.o. female  who presents today in the office for:    Other (Patient presents for psoriasis 6 months. She is currently on Skyrizi q12 weeks and clobetasol. She states this combination keeps her well controlled. Last skyrizi injection was 3/22. No current flares.)      HISTORY OF PRESENT ILLNESS:  Patient presents for a 6 month follow up for psoriasis. Patient is currently on Skyrizi. She is also prescribed clobetasol.     Patient reports that her psoriasis has been well controlled with Skyrizi and clobetasol. Her last injection was 3/22. No current flares.       MEDICAL PROBLEMS:  Patient Active Problem List    Diagnosis Date Noted    Pharyngitis, acute 03/01/2020    Acute streptococcal pharyngitis 03/01/2020    Essential hypertension 08/22/2019    Morbid obesity with BMI of 50.0-59.9, adult ([facility]) 08/22/2019    Polycystic ovarian syndrome 08/22/2019    NEDA (obstructive sleep apnea) 01/25/2018       CURRENT MEDICATIONS:   Current Outpatient Medications   Medication Sig Dispense Refill    Risankizumab-rzaa (SKYRIZI PEN) 150 MG/ML SOAJ Inject 150mg SQ every 12 weeks 1 mL 2    clobetasol (TEMOVATE) 0.05 % cream APPLY 1 GRAM TOPICALLY TO ACTIVE RASH TWICE DAILY 120 g 3    azithromycin (ZITHROMAX) 250 MG tablet Take 2 tabs (500 mg) on Day 1, and take 1 tab (250 mg) on days 2 through 5. 1 packet 0    lisinopril (PRINIVIL;ZESTRIL) 10 MG tablet Take 1 tablet by mouth daily 30 tablet 5    clindamycin (CLEOCIN T) 1 % lotion Apply to folliculitis on back daily until resolved 60 mL 3     No current facility-administered medications for this visit.       ALLERGIES:   No Known Allergies    SOCIAL HISTORY:  Social History

## 2024-04-04 ENCOUNTER — OFFICE VISIT (OUTPATIENT)
Dept: DERMATOLOGY | Age: 34
End: 2024-04-04
Payer: COMMERCIAL

## 2024-04-04 VITALS
DIASTOLIC BLOOD PRESSURE: 69 MMHG | TEMPERATURE: 98.2 F | WEIGHT: 293 LBS | HEART RATE: 100 BPM | OXYGEN SATURATION: 96 % | SYSTOLIC BLOOD PRESSURE: 130 MMHG | HEIGHT: 67 IN | BODY MASS INDEX: 45.99 KG/M2

## 2024-04-04 DIAGNOSIS — Z79.899 HIGH RISK MEDICATION USE: ICD-10-CM

## 2024-04-04 DIAGNOSIS — L40.9 PSORIASIS: Primary | ICD-10-CM

## 2024-04-04 PROCEDURE — G8417 CALC BMI ABV UP PARAM F/U: HCPCS | Performed by: DERMATOLOGY

## 2024-04-04 PROCEDURE — 4004F PT TOBACCO SCREEN RCVD TLK: CPT | Performed by: DERMATOLOGY

## 2024-04-04 PROCEDURE — 99213 OFFICE O/P EST LOW 20 MIN: CPT | Performed by: DERMATOLOGY

## 2024-04-04 PROCEDURE — G8427 DOCREV CUR MEDS BY ELIG CLIN: HCPCS | Performed by: DERMATOLOGY

## 2024-04-04 PROCEDURE — 3075F SYST BP GE 130 - 139MM HG: CPT | Performed by: DERMATOLOGY

## 2024-04-04 PROCEDURE — 3078F DIAST BP <80 MM HG: CPT | Performed by: DERMATOLOGY

## 2024-04-04 RX ORDER — RISANKIZUMAB-RZAA 150 MG/ML
INJECTION SUBCUTANEOUS
Qty: 1 ML | Refills: 1 | Status: ACTIVE | OUTPATIENT
Start: 2024-04-04

## 2024-09-04 NOTE — PROGRESS NOTES
Baltazar Coleman came in office today for instruction of Skyrizi injection. Explained to patient that 1st injection is a double dose, but every injection after that will just be a single dose. Went over brochure with patient on correct injection sites, angle of pen and how long to hold pen in place after hearing the click and transfer of medication begins. One injection given into rt abdomen subcutaneously. Patient tolerated the injections well. Patient was asked to wait 15 minutes before leaving office to make sure they did not have a negative reaction to the medication. Patient advised they felt fine after 15 minutes of observation and were released. This was a shared visit with the ROSALBA. I reviewed and verified the documentation.

## 2024-10-14 NOTE — PROGRESS NOTES
Dermatology Patient Note  [facility] DERMATOLOGY  [facility]  [address]  [facility] [address]  Dept: [phone]  Dept Fax: [phone]      VISITDATE: 10/15/2024   REFERRING PROVIDER: No ref. provider found      Radha Espinoza is a 34 y.o. female  who presents today in the office for:    Other (Patient presents for a 6 month PS follow up. States doing well on the skyrizi, her last injection was around 2 1/2 months ago. She has noticed since starting the injection she has been gaining a lot of weight and is concerned with that. No flare ups. )      HISTORY OF PRESENT ILLNESS:  Patient presents for 6 month psoriasis follow up. At last visit she was well controlled on Skyrizi and Clobetasol.     She reports today that she is doing well with Skyrizi, and her last injection was 2.5 months ago. She denies flare ups. She denies hospitalizations or infections that have necessitated antibiotic use since LV. She has chronic sinus infections.     She is concerned about weight gain. She does not currently follow with a PCP as hers retired.     MEDICAL PROBLEMS:  Patient Active Problem List    Diagnosis Date Noted    Pharyngitis, acute 03/01/2020    Acute streptococcal pharyngitis 03/01/2020    Essential hypertension 08/22/2019    Morbid obesity with BMI of 50.0-59.9, adult 08/22/2019    Polycystic ovarian syndrome 08/22/2019    NEDA (obstructive sleep apnea) 01/25/2018       CURRENT MEDICATIONS:   Current Outpatient Medications   Medication Sig Dispense Refill    Risankizumab-rzaa (SKYRIZI PEN) 150 MG/ML SOAJ Inject 150mg SQ every 12 weeks 1 mL 1    clobetasol (TEMOVATE) 0.05 % cream APPLY 1 GRAM TOPICALLY TO ACTIVE RASH TWICE DAILY 120 g 3    lisinopril (PRINIVIL;ZESTRIL) 10 MG tablet Take 1 tablet by mouth daily 30 tablet 5    clindamycin (CLEOCIN T) 1 % lotion Apply to folliculitis on back daily until resolved 60 mL 3    azithromycin (ZITHROMAX) 250

## 2024-10-14 NOTE — PATIENT INSTRUCTIONS
- Labs today  - Clobetasol twice daily to active psoriasis  - We will submit a prior authorization for Humira and contact you once approved      Psoriasis    What is psoriasis? Psoriasis (sore-EYE-ah-sis) is a chronic (long-lasting) disease. It develops when a persons immune system sends faulty signals that tell skin cells to grow too quickly. New skin cells form in days rather than weeks. The body does not shed these excess skin cells. The skin cells pile up on the surface of the skin, causing patches of psoriasis to appear. Psoriasis may look contagious, but it's not. You cannot get psoriasis from touching someone who has it. To get psoriasis, a person must inherit the genes that cause it. Types of psoriasis If you have psoriasis, you will have one or more of these types: Plaque (also called psoriasis vulgaris). Guttate. Inverse (also called flexural psoriasis or intertriginous psoriasis). Pustular. Erythrodermic (also called exfoliative psoriasis). Some people get more than one type. Sometimes a person gets one type of psoriasis, and then the type of psoriasis changes. Signs and symptoms:  What you see and feel depends on the type of psoriasis you have. You may have just a few of the signs and symptoms listed below, or you may have many. Plaque psoriasis  (also called psoriasis vulgaris)  Raised, reddish patches on the skin called plaque (plak). Patches may be covered with a silvery-white coating, which dermatologists call scale. Patches can appear anywhere on the skin. Most patches appear on the knees, elbows, lower back, and scalp. Patches can itch. Scratching the itchy patches often causes the patches to thicken. Patches vary in size and can appear as separate patches or join together to cover a large area. Nail problems -- pits in the nails, crumbling nail, nail falls off. What you see and feel depends on the type of psoriasis you have.  You may have just a few of the signs and symptoms listed below, or you may have many. Plaque psoriasis (also called psoriasis vulgaris) Raised, reddish patches on the skin called plaque (plak). Patches may be covered with a silvery-white coating, which dermatologists call scale. Patches can appear anywhere on the skin. Most patches appear on the knees, elbows, lower back, and scalp. Patches can itch. Scratching the itchy patches often causes the patches to thicken. Patches vary in size and can appear as separate patches or join together to cover a large area. Nail problems -- pits in the nails, crumbling nail, nail falls off. Guttate psoriasis  Small, red spots (usually on the trunk, arms, and legs but can appear on the scalp, face, and ears). Spots can show up all over the skin. Spots often appear after an illness, especially strep throat. Spots may clear up in a few weeks or months without treatment. Spots may appear where the person had plaque psoriasis. Pustular psoriasis  Skin red, swollen, and dotted with pus-filled bumps. Bumps usually appear only on the palms and soles. Soreness and pain where the bumps appear. Pus-filled bumps will dry, and leave behind brown dots and/or scale on the skin. When pus-filled bumps cover the body, the person also may have:  Guttate psoriasis Small, red spots (usually on the trunk, arms, and legs but can appear on the scalp, face, and ears). Spots can show up all over the skin. Spots often appear after an illness, especially strep throat. Spots may clear up in a few weeks or months without treatment. Spots may appear where the person had plaque psoriasis. Pustular psoriasis Skin red, swollen, and dotted with pus-filled bumps. Bumps usually appear only on the palms and soles. Soreness and pain where the bumps appear. Pus-filled bumps will dry, and leave behind brown dots and/or scale on the skin. When pus-filled bumps cover the body, the person also may have:  Bright-red skin.  Been feeling sick and exhausted. Fever. Chills. Severe itching. Rapid pulse. Loss of appetite. Muscle weakness. Inverse psoriasis   (also called flexural psoriasis or intertriginous psoriasis)Inverse psoriasis (also called flexural psoriasis or intertriginous psoriasis)  Smooth, red patches of skin that look raw. Patches only develop where skin touches skin, such as the armpits, around the groin, genitals, and buttocks. Women can develop a red, raw patch under their breasts. Skin feels very sore where inverse psoriasis appears. Erythrodermic psoriasis  (also called exfoliative psoriasis)  Skin looks like it is burned. Most (or all) of the skin on the body turns bright red. Body cannot maintain its normal temperature of 98.6° F. Person gets very hot or very cold. Heart beats too fast.   Intense itching. Intense pain. Smooth, red patches of skin that look raw. Patches only develop where skin touches skin, such as the armpits, around the groin, genitals, and buttocks. Women can develop a red, raw patch under their breasts. Skin feels very sore where inverse psoriasis appears. Erythrodermic psoriasis (also called exfoliative psoriasis) Skin looks like it is burned. Most (or all) of the skin on the body turns bright red. Body cannot maintain its normal temperature of 98.6° F. Person gets very hot or very cold. Heart beats too fast. Intense itching. Intense pain. If it looks like a person has erythrodermic psoriasis, get the person to a hospital right away. The persons life may be in danger. Who gets psoriasis? People who get psoriasis usually have one or more person in their family who has psoriasis. Not everyone who has a family member with psoriasis will get psoriasis. But psoriasis is common. In the [place], about 7.5 million people have psoriasis. Most people, about 80%, have plaque psoriasis. Psoriasis can begin at any age. Most people get psoriasis between 13and 27years of age.  By age 36, most people who will get psoriasis, about 75%, have psoriasis. Another common time for psoriasis to begin is between 48and 61years of age. Whites get psoriasis more often than other races. Infants and young children are more likely to get inverse psoriasis and guttate psoriasis. What causes psoriasis? Scientists are still trying to learn everything that happens inside the body to cause psoriasis. We know that psoriasis is not contagious. You cannot get psoriasis from swimming in the same pool or having sex. Scientists have learned that a persons immune system and genes play important roles. It seems that many genes must interact to cause psoriasis. Scientists also know that not everyone who inherits the genes for psoriasis will get psoriasis. It seems that a person must inherit the right mix of genes. Then the person must be exposed to a trigger. Many people say that their psoriasis began after they experienced one of these common psoriasis triggers: A stressful event. Strep throat. Taking certain medicines, such as lithium, or medicine to prevent malaria. Cold, dry weather. A cut, scratch, or bad sunburn. How does a dermatologist diagnose psoriasis? To diagnose psoriasis, a dermatologist:  Examines a patients skin, nails, and scalp for signs of psoriasis. Asks whether family members have psoriasis. Learns about what has been happening in the patients life. A dermatologist may want to know whether a patient has been under a lot of stress, had a recent illness, or just started taking a medicine. Sometimes a dermatologist also removes a bit of skin. A dermatologist may call this confirming the diagnosis. By looking at the removed skin under a microscope, one can confirm whether a person has psoriasis. How do dermatologists treat psoriasis? Treating psoriasis has benefits. Treatment can reduce signs and symptoms of psoriasis, which usually makes a person feel better.  With treatment, some people see their skin completely clear. Treatment can even improve a person's quality of life. Thanks to ongoing research, there are many treatments for psoriasis. It is important to work with a dermatologist to find treatment that works for you and fits your lifestyle. Every treatment has benefits, drawbacks, and possible side effects. Outcome  Psoriasis is a chronic (long-lasting) disease of the immune system. It cannot be cured. This means that most people have psoriasis for life. By teaming up with a dermatologist who treats psoriasis, you can find a treatment plan that works for you. Dermatologists encourage their patients who have psoriasis to take an active role in managing this disease. By taking an active role, you can reduce the effects that psoriasis has on your quality of life. How does a dermatologist diagnose psoriasis? To diagnose psoriasis, a dermatologist: Examines a patients skin, nails, and scalp for signs of psoriasis. Asks whether family members have psoriasis. Learns about what has been happening in the patients life. A dermatologist may want to know whether a patient has been under a lot of stress, had a recent illness, or just started taking a medicine. Sometimes a dermatologist also removes a bit of skin. A dermatologist may call this confirming the diagnosis. By looking at the removed skin under a microscope, one can confirm whether a person has psoriasis. How do dermatologists treat psoriasis? Treating psoriasis has benefits. Treatment can reduce signs and symptoms of psoriasis, which usually makes a person feel better. With treatment, some people see their skin completely clear. Treatment can even improve a person's quality of life. Thanks to ongoing research, there are many treatments for psoriasis. It is important to work with a dermatologist to find treatment that works for you and fits your lifestyle. Every treatment has benefits, drawbacks, and possible side effects.  Outcome Psoriasis is a chronic (long-lasting) disease of the immune system. It cannot be cured. This means that most people have psoriasis for life. By teaming up with a dermatologist who treats psoriasis, you can find a treatment plan that works for you. Dermatologists encourage their patients who have psoriasis to take an active role in managing this disease. By taking an active role, you can reduce the effects that psoriasis has on your quality of life. Psoriasis: Tips for managing  Psoriasis is a long-lasting disease. Here are some things you can do that will help you take control. Learn about psoriasis. Knowledge really is power. Learning about psoriasis will help you manage the disease, make informed decisions about how you treat psoriasis, and avoid things that can make psoriasis worse. It will also help you talk about psoriasis with others. Take good care of yourself. Eating a healthy diet, exercising, not smoking, and drinking very little alcohol will help. Smoking, drinking, and being overweight make psoriasis worse. These also can make treatment less effective. People who have psoriasis also have an increased risk for developing heart disease, diabetes, and other diseases, so taking good care of yourself is essential.  Be aware of your joints. If your joints feel stiff and sore, especially when you wake up, see a dermatologist. Stiff or sore joints can be the first sign of psoriatic (sore-EE-at-ic) arthritis. About 10% to 30% of people who have psoriasis get this type of arthritis. Treatment is essential. This type of arthritis can eat away the joints. Treatment can prevent deformed joints and disability. Notice your nails. If your nails begin to pull away from the nail bed or develop pitting, ridges, or a yellowish-orange color, see a dermatologist. These are signs of psoriatic arthritis. Pay attention to your mood.  If you feel depressed, you may want to join a psoriasis support group or see a mental health professional. Depression, anxiety, and suicidal behavior are more common in people who have psoriasis. Getting help is not a sign of weakness. Learn about treatment for psoriasis. Some people choose not to treat psoriasis, but it is important to know your options. This will help you make an informed decision and feel in control. Tell your dermatologist if you cannot afford the medicine. You may be eligible for financial assistance. To learn more about this assistance, visit Financial Assistance Available for Psoriasis Medication. Talk with your dermatologist before you stop taking medicine for psoriasis. Immediately stopping a medicine for psoriasis can have serious consequences. It can cause one type of psoriasis to turn into another, more serious type of psoriasis. Lets say a person who has plaque psoriasis takes a medicine called methotrexate. If the person just stops taking methotrexate, this can cause the plaque psoriasis to turn into guttate psoriasis or erythrodermic psoriasis. This can be very serious. Tips for managing Psoriasis:It is a long-lasting disease. Here are some things you can do that will help you take control. Learn about psoriasis. Knowledge really is power. Learning about psoriasis will help you manage the disease, make informed decisions about how you treat psoriasis, and avoid things that can make psoriasis worse. It will also help you talk about psoriasis with others. Take good care of yourself. Eating a healthy diet, exercising, not smoking, and drinking very little alcohol will help. Smoking, drinking, and being overweight make psoriasis worse. These also can make treatment less effective. People who have psoriasis also have an increased risk for developing heart disease, diabetes, and other diseases, so taking good care of yourself is essential. Be aware of your joints.  If your joints feel stiff and sore, especially when you wake up, see a dermatologist. Stiff or sore joints can be the first sign of psoriatic (sore-EE-at-ic) arthritis. About 10% to 30% of people who have psoriasis get this type of arthritis. Treatment is essential. This type of arthritis can eat away the joints. Treatment can prevent deformed joints and disability. Notice your nails. If your nails begin to pull away from the nail bed or develop pitting, ridges, or a yellowish-orange color, see a dermatologist. These are signs of psoriatic arthritis. Pay attention to your mood. If you feel depressed, you may want to join a psoriasis support group or see a mental health professional. Depression, anxiety, and suicidal behavior are more common in people who have psoriasis. Getting help is not a sign of weakness. Learn about treatment for psoriasis. Some people choose not to treat psoriasis, but it is important to know your options. This will help you make an informed decision and feel in control. Tell your dermatologist if you cannot afford the medicine. You may be eligible for financial assistance. To learn more about this assistance, visit Financial Assistance Available for Psoriasis Medication. Talk with your dermatologist before you stop taking medicine for psoriasis. Immediately stopping a medicine for psoriasis can have serious consequences. It can cause one type of psoriasis to turn into another, more serious type of psoriasis. Lets say a person who has plaque psoriasis takes a medicine called methotrexate. If the person just stops taking methotrexate, this can cause the plaque psoriasis to turn into guttate psoriasis or erythrodermic psoriasis. This can be very serious. POVNYP:[id number] [employer]     ADALIMUMAB (Humira)     This patient handout has been adapted for use by Nia Hernandez MD of [facility] [place], [place]) Dermatology from the [facility] Dermatology website. What are the aims of this handout? This handout has been written to help you understand more about adalimumab (Humira).  It tells you what it is, how it works, how it is used to treat skin conditions, and where you can find out more about it. What is adalimumab and how does it work? Adalimumab is a powerful drug that has been specially designed to mimic normal human molecules, and for this reason it is classed as a biological drug. It reduces inflammation by inhibiting the activity of a chemical cytokine in the body called tumour necrosis factor alpha (TNF-alpha). Which conditions are treated with adalimumab? Adalimumab is used to treat psoriasis, psoriatic arthritis, and several other inflammatory conditions such as rheumatoid arthritis and Crohns disease. Why have I been selected for treatment with adalimumab? The use of adalimumab is reserved for patients with severe psoriasis who either have not responded to standard treatments, have not been able to tolerate standard treatments or who have a contraindication to standard treatments such as methotrexate. It may also be used for patients with hidradenitis suppurativa who have not responded to or tolerated standard treatments. How long will I need to take adalimumab before it has an effect? Adalimumab does not work immediately and it may be 3-12 weeks before you notice any benefit. In clinical trials, about 68% of patients achieved a good response (as indicated by 75% improvement in the score of their disease severity). How do I take adalimumab? Adalimumab is presented as a pre-filled syringe or in a pre-filled pen device. The pen device automatically injects the drug under the skin. A nurse or doctor will demonstrate how to use the pen; details are also given in the package insert. It must be stored in a refrigerator (at 2-8°C). Travelling with adalimumab, or transporting your treatment, requires a cool box or cool bag with icepacks to maintain these temperatures. Injections are made under the skin of the stomach, thighs or upper outer arms.  You will be adalimumab? Patients on adalimumab should not be given any of the 'live' vaccines such as those for polio, rubella ([place] measles) and yellow fever, although inactivated vaccines are safe. If you require immuniaation with a live vaccine, adalimumab should be stopped for at least 6 months before and until 2 weeks after the vaccination. Pneumovax and annual influenza vaccinations are safe and are recommended; however, the new nasal flu vaccination is live and should not be given with adalimumab (see Patient Information Leaflet on Immunizations). Does adalimumab affect pregnancy? Adalimumab has not been studied in pregnant women or nursing mothers, and so its effects on unborn children or babies who are being breast fed are unknown. You should tell your doctor if you are pregnant or become pregnant, and should discuss with your doctor any plan to become pregnant. Otherwise use appropriate contraception. Can I travel abroad while taking adalimumab? Please discuss with your dermatologist if you are planning to travel abroad. Depending on the length of your travel plans, you may need a cool box to take the medication with you. Depending on where you are travelling, precautions may need to be taken against infections. Your dermatologist may be able to advice you on this. May I drink alcohol while I am taking adalimumab? There is no known interaction between alcohol and adalimumab. Can I take other medicines at the same time as adalimumab? Most medicines are safe to take with adalimumab. However, it is important that your GP and other doctors are aware that you are taking it if any new drug is prescribed. Your GP and dermatologist should be aware of all your medications, including over-the-counter ones, and supplements, including herbal medicines. Methotrexate can be taken along with adalimumab.  However, you should not take other immunosuppressives (medicines which suppress the immune system) while you are on adalimumab. Can I sunbathe? Sun beds and sunbathing should be avoided to reduce the risk of skin cancer. Where can I find out more about adalimumab? This information sheet does not list all of the side effects of adalimumab. If you wish to find out more about adalimumab, or if you are worried about your treatment, you should speak to your doctor, specialist nurse or pharmacist. For clarke details, look at the drug information sheet which comes as an insert with your prescription for adalimumab. DISPLAY PLAN FREE TEXT

## 2024-10-15 ENCOUNTER — OFFICE VISIT (OUTPATIENT)
Dept: DERMATOLOGY | Age: 34
End: 2024-10-15
Payer: COMMERCIAL

## 2024-10-15 VITALS
HEART RATE: 106 BPM | DIASTOLIC BLOOD PRESSURE: 90 MMHG | BODY MASS INDEX: 45.99 KG/M2 | WEIGHT: 293 LBS | OXYGEN SATURATION: 96 % | TEMPERATURE: 97.8 F | SYSTOLIC BLOOD PRESSURE: 139 MMHG | HEIGHT: 67 IN

## 2024-10-15 DIAGNOSIS — L40.9 PSORIASIS: Primary | ICD-10-CM

## 2024-10-15 DIAGNOSIS — Z79.899 HIGH RISK MEDICATION USE: ICD-10-CM

## 2024-10-15 PROCEDURE — G8484 FLU IMMUNIZE NO ADMIN: HCPCS | Performed by: DERMATOLOGY

## 2024-10-15 PROCEDURE — 99213 OFFICE O/P EST LOW 20 MIN: CPT | Performed by: DERMATOLOGY

## 2024-10-15 PROCEDURE — 3080F DIAST BP >= 90 MM HG: CPT | Performed by: DERMATOLOGY

## 2024-10-15 PROCEDURE — G8417 CALC BMI ABV UP PARAM F/U: HCPCS | Performed by: DERMATOLOGY

## 2024-10-15 PROCEDURE — G8427 DOCREV CUR MEDS BY ELIG CLIN: HCPCS | Performed by: DERMATOLOGY

## 2024-10-15 PROCEDURE — 3075F SYST BP GE 130 - 139MM HG: CPT | Performed by: DERMATOLOGY

## 2024-10-15 PROCEDURE — 4004F PT TOBACCO SCREEN RCVD TLK: CPT | Performed by: DERMATOLOGY

## 2024-10-15 RX ORDER — RISANKIZUMAB-RZAA 150 MG/ML
INJECTION SUBCUTANEOUS
Qty: 1 ML | Refills: 1 | Status: CANCELLED | OUTPATIENT
Start: 2024-10-15

## 2024-10-15 NOTE — PATIENT INSTRUCTIONS
Complete TB lab as ordered  Continue Darek     Recommended that she find a PCP that she likes to discuss weight management. Suggested Dr. Saurav Roberts at [facility]

## 2025-01-13 ENCOUNTER — HOSPITAL ENCOUNTER (OUTPATIENT)
Age: 35
Discharge: HOME | End: 2025-01-13
Payer: COMMERCIAL

## 2025-01-13 DIAGNOSIS — Z79.899 HIGH RISK MEDICATION USE: ICD-10-CM

## 2025-01-13 PROCEDURE — 36415 COLL VENOUS BLD VENIPUNCTURE: CPT

## 2025-01-13 PROCEDURE — 86480 TB TEST CELL IMMUN MEASURE: CPT

## 2025-01-15 LAB
QUANTI TB GOLD PLUS: NEGATIVE
QUANTI TB1 MINUS NIL: 0.01 IU/ML
QUANTI TB2 MINUS NIL: 0.03 IU/ML
QUANTIFERON MITOGEN: 9.87 IU/ML
QUANTIFERON NIL: 0.13 IU/ML

## 2025-01-16 DIAGNOSIS — L40.9 PSORIASIS: Primary | ICD-10-CM

## 2025-01-16 RX ORDER — RISANKIZUMAB-RZAA 150 MG/ML
INJECTION SUBCUTANEOUS
Qty: 1 ML | Refills: 1 | Status: ACTIVE | OUTPATIENT
Start: 2025-01-16

## 2025-01-21 DIAGNOSIS — L40.9 PSORIASIS: ICD-10-CM

## 2025-01-21 RX ORDER — CLOBETASOL PROPIONATE 0.5 MG/G
CREAM TOPICAL
Qty: 120 G | Refills: 3 | Status: SHIPPED | OUTPATIENT
Start: 2025-01-21

## 2025-04-14 NOTE — PROGRESS NOTES
Dermatology Patient Note  [facility]  [facility] DERMATOLOGY  [address] [place] [address]  Dept: [phone]  Dept Fax: [phone]      VISITDATE: 4/15/2025   REFERRING PROVIDER: No ref. provider found      Radha Espinoza is a 34 y.o. female  who presents today in the office for:    Other (Patient presents today as a 6mo f/u psoriasis.  Patient admits skin is doing well. Patient is present today for refills.Skyrizi and clobetasol.  Patient offers no new concerns today.  )      HISTORY OF PRESENT ILLNESS:  Patient presents for 6 month psoriasis follow up. At , she was continued on Skyrizi.     Today, patient reports that she is doing well. She has one mild patch on her right lower leg but states it is well controlled with use of the topical. Denies any side effects on the Skyrizi. No hospitalizations, need for antibiotics, or infections since last visit.     MEDICAL PROBLEMS:  Patient Active Problem List    Diagnosis Date Noted    Pharyngitis, acute 03/01/2020    Acute streptococcal pharyngitis 03/01/2020    Essential hypertension 08/22/2019    Morbid obesity with BMI of 50.0-59.9, adult 08/22/2019    Polycystic ovarian syndrome 08/22/2019    NEDA (obstructive sleep apnea) 01/25/2018       CURRENT MEDICATIONS:   Current Outpatient Medications   Medication Sig Dispense Refill    clobetasol (TEMOVATE) 0.05 % cream APPLY 1 GRAM TOPICALLY TO ACTIVE RASH TWICE DAILY 120 g 3    Risankizumab-rzaa (SKYRIZI PEN) 150 MG/ML SOAJ Inject 150mg SQ every 12 weeks 1 mL 1    Risankizumab-rzaa (SKYRIZI PEN) 150 MG/ML SOAJ Inject 150mg SQ every 12 weeks 1 mL 1    lisinopril (PRINIVIL;ZESTRIL) 10 MG tablet Take 1 tablet by mouth daily 30 tablet 5    clindamycin (CLEOCIN T) 1 % lotion Apply to folliculitis on back daily until resolved 60 mL 3    azithromycin (ZITHROMAX) 250 MG tablet Take 2 tabs (500 mg) on Day 1, and take 1 tab (250 mg) on days 2 through 5. (Patient not taking: Reported

## 2025-04-15 ENCOUNTER — OFFICE VISIT (OUTPATIENT)
Age: 35
End: 2025-04-15
Payer: COMMERCIAL

## 2025-04-15 VITALS
OXYGEN SATURATION: 100 % | DIASTOLIC BLOOD PRESSURE: 88 MMHG | TEMPERATURE: 99 F | WEIGHT: 293 LBS | BODY MASS INDEX: 45.99 KG/M2 | HEIGHT: 67 IN | SYSTOLIC BLOOD PRESSURE: 140 MMHG | HEART RATE: 97 BPM

## 2025-04-15 DIAGNOSIS — Z79.899 HIGH RISK MEDICATION USE: Primary | ICD-10-CM

## 2025-04-15 DIAGNOSIS — L40.9 PSORIASIS: ICD-10-CM

## 2025-04-15 PROCEDURE — 99212 OFFICE O/P EST SF 10 MIN: CPT | Performed by: DERMATOLOGY

## 2025-04-15 RX ORDER — RISANKIZUMAB-RZAA 150 MG/ML
INJECTION SUBCUTANEOUS
Qty: 1 ML | Refills: 1 | Status: ACTIVE | OUTPATIENT
Start: 2025-04-15

## 2025-04-15 RX ORDER — CLOBETASOL PROPIONATE 0.5 MG/G
CREAM TOPICAL
Qty: 120 G | Refills: 3 | Status: SHIPPED | OUTPATIENT
Start: 2025-04-15